# Patient Record
Sex: FEMALE | Race: WHITE | NOT HISPANIC OR LATINO | Employment: PART TIME | URBAN - METROPOLITAN AREA
[De-identification: names, ages, dates, MRNs, and addresses within clinical notes are randomized per-mention and may not be internally consistent; named-entity substitution may affect disease eponyms.]

---

## 2017-05-08 ENCOUNTER — HISTORICAL (OUTPATIENT)
Dept: LAB | Facility: HOSPITAL | Age: 57
End: 2017-05-08

## 2017-05-08 LAB
CHOLEST SERPL-MCNC: 132 MG/DL (ref 0–200)
CHOLEST/HDLC SERPL: 2.1 {RATIO} (ref 0–4)
HDLC SERPL-MCNC: 62 MG/DL (ref 35–60)
LDLC SERPL CALC-MCNC: 48 MG/DL (ref 0–129)
TRIGL SERPL-MCNC: 110 MG/DL (ref 30–150)
VLDLC SERPL CALC-MCNC: 22 MG/DL

## 2018-05-14 ENCOUNTER — HISTORICAL (OUTPATIENT)
Dept: LAB | Facility: HOSPITAL | Age: 58
End: 2018-05-14

## 2018-05-14 LAB
ABS NEUT (OLG): 2.53 X10(3)/MCL (ref 2.1–9.2)
ALBUMIN SERPL-MCNC: 4 GM/DL (ref 3.4–5)
ALBUMIN/GLOB SERPL: 1.4 RATIO (ref 1.1–2)
ALP SERPL-CCNC: 34 UNIT/L (ref 38–126)
ALT SERPL-CCNC: 25 UNIT/L (ref 12–78)
AST SERPL-CCNC: 19 UNIT/L (ref 15–37)
BASOPHILS # BLD AUTO: 0 X10(3)/MCL (ref 0–0.2)
BASOPHILS NFR BLD AUTO: 1 %
BILIRUB SERPL-MCNC: 0.9 MG/DL (ref 0.2–1)
BILIRUBIN DIRECT+TOT PNL SERPL-MCNC: 0.2 MG/DL (ref 0–0.5)
BILIRUBIN DIRECT+TOT PNL SERPL-MCNC: 0.7 MG/DL (ref 0–0.8)
BUN SERPL-MCNC: 15 MG/DL (ref 7–18)
CALCIUM SERPL-MCNC: 9.3 MG/DL (ref 8.5–10.1)
CHLORIDE SERPL-SCNC: 104 MMOL/L (ref 98–107)
CHOLEST SERPL-MCNC: 217 MG/DL (ref 0–200)
CHOLEST/HDLC SERPL: 3.4 {RATIO} (ref 0–4)
CK SERPL-CCNC: 61 UNIT/L (ref 26–192)
CO2 SERPL-SCNC: 31 MMOL/L (ref 21–32)
CREAT SERPL-MCNC: 0.92 MG/DL (ref 0.55–1.02)
EOSINOPHIL # BLD AUTO: 0.2 X10(3)/MCL (ref 0–0.9)
EOSINOPHIL NFR BLD AUTO: 4 %
ERYTHROCYTE [DISTWIDTH] IN BLOOD BY AUTOMATED COUNT: 12.2 % (ref 11.5–17)
GLOBULIN SER-MCNC: 2.9 GM/DL (ref 2.4–3.5)
GLUCOSE SERPL-MCNC: 81 MG/DL (ref 74–106)
HCT VFR BLD AUTO: 43.4 % (ref 37–47)
HDLC SERPL-MCNC: 64 MG/DL (ref 35–60)
HGB BLD-MCNC: 13.4 GM/DL (ref 12–16)
IRON SERPL-MCNC: 110 MCG/DL (ref 50–175)
LDLC SERPL CALC-MCNC: 128 MG/DL (ref 0–129)
LYMPHOCYTES # BLD AUTO: 1.2 X10(3)/MCL (ref 0.6–4.6)
LYMPHOCYTES NFR BLD AUTO: 28 %
MCH RBC QN AUTO: 27.7 PG (ref 27–31)
MCHC RBC AUTO-ENTMCNC: 30.9 GM/DL (ref 33–36)
MCV RBC AUTO: 89.7 FL (ref 80–94)
MONOCYTES # BLD AUTO: 0.3 X10(3)/MCL (ref 0.1–1.3)
MONOCYTES NFR BLD AUTO: 8 %
NEUTROPHILS # BLD AUTO: 2.53 X10(3)/MCL (ref 1.4–7.9)
NEUTROPHILS NFR BLD AUTO: 59 %
PLATELET # BLD AUTO: 306 X10(3)/MCL (ref 130–400)
PMV BLD AUTO: 9.5 FL (ref 9.4–12.4)
POTASSIUM SERPL-SCNC: 4 MMOL/L (ref 3.5–5.1)
PROT SERPL-MCNC: 6.9 GM/DL (ref 6.4–8.2)
RBC # BLD AUTO: 4.84 X10(6)/MCL (ref 4.2–5.4)
SODIUM SERPL-SCNC: 141 MMOL/L (ref 136–145)
TRIGL SERPL-MCNC: 124 MG/DL (ref 30–150)
VLDLC SERPL CALC-MCNC: 25 MG/DL
WBC # SPEC AUTO: 4.3 X10(3)/MCL (ref 4.5–11.5)

## 2018-08-19 ENCOUNTER — OFFICE VISIT (OUTPATIENT)
Dept: URGENT CARE | Facility: CLINIC | Age: 58
End: 2018-08-19
Payer: COMMERCIAL

## 2018-08-19 VITALS
TEMPERATURE: 97 F | HEART RATE: 88 BPM | OXYGEN SATURATION: 100 % | SYSTOLIC BLOOD PRESSURE: 134 MMHG | DIASTOLIC BLOOD PRESSURE: 85 MMHG

## 2018-08-19 DIAGNOSIS — J32.9 RHINOSINUSITIS: Primary | ICD-10-CM

## 2018-08-19 DIAGNOSIS — L40.9 PSORIASIS: ICD-10-CM

## 2018-08-19 PROCEDURE — 99204 OFFICE O/P NEW MOD 45 MIN: CPT | Mod: 25,S$GLB,, | Performed by: PHYSICIAN ASSISTANT

## 2018-08-19 PROCEDURE — 96372 THER/PROPH/DIAG INJ SC/IM: CPT | Mod: S$GLB,,, | Performed by: FAMILY MEDICINE

## 2018-08-19 RX ORDER — METOPROLOL SUCCINATE 25 MG/1
TABLET, EXTENDED RELEASE ORAL
Refills: 2 | COMMUNITY
Start: 2018-08-10 | End: 2022-10-24

## 2018-08-19 RX ORDER — VERAPAMIL HYDROCHLORIDE 120 MG/1
TABLET, FILM COATED, EXTENDED RELEASE ORAL
Refills: 1 | COMMUNITY
Start: 2018-06-19 | End: 2022-11-07

## 2018-08-19 RX ORDER — OLMESARTAN MEDOXOMIL AND HYDROCHLOROTHIAZIDE 20/12.5 20; 12.5 MG/1; MG/1
TABLET ORAL
COMMUNITY
Start: 2015-04-15 | End: 2022-11-07

## 2018-08-19 RX ORDER — BENZONATATE 100 MG/1
100 CAPSULE ORAL 3 TIMES DAILY PRN
Qty: 30 CAPSULE | Refills: 1 | Status: SHIPPED | OUTPATIENT
Start: 2018-08-19 | End: 2019-08-19

## 2018-08-19 RX ORDER — OMEPRAZOLE 20 MG/1
20 CAPSULE, DELAYED RELEASE ORAL
COMMUNITY
End: 2022-11-07

## 2018-08-19 RX ORDER — BETAMETHASONE SODIUM PHOSPHATE AND BETAMETHASONE ACETATE 3; 3 MG/ML; MG/ML
9 INJECTION, SUSPENSION INTRA-ARTICULAR; INTRALESIONAL; INTRAMUSCULAR; SOFT TISSUE
Status: COMPLETED | OUTPATIENT
Start: 2018-08-19 | End: 2018-08-19

## 2018-08-19 RX ORDER — PREDNISONE 20 MG/1
TABLET ORAL
Qty: 18 TABLET | Refills: 0 | Status: SHIPPED | OUTPATIENT
Start: 2018-08-19 | End: 2022-11-07

## 2018-08-19 RX ORDER — PROMETHAZINE HYDROCHLORIDE AND DEXTROMETHORPHAN HYDROBROMIDE 6.25; 15 MG/5ML; MG/5ML
5 SYRUP ORAL NIGHTLY PRN
Qty: 180 ML | Refills: 1 | Status: SHIPPED | OUTPATIENT
Start: 2018-08-19 | End: 2018-08-29

## 2018-08-19 RX ORDER — CETIRIZINE HYDROCHLORIDE 10 MG/1
10 TABLET ORAL DAILY
COMMUNITY
End: 2022-11-07

## 2018-08-19 RX ORDER — FLUTICASONE PROPIONATE 50 MCG
1 SPRAY, SUSPENSION (ML) NASAL DAILY
Qty: 1 BOTTLE | Refills: 1 | Status: SHIPPED | OUTPATIENT
Start: 2018-08-19 | End: 2022-11-07

## 2018-08-19 RX ADMIN — BETAMETHASONE SODIUM PHOSPHATE AND BETAMETHASONE ACETATE 9 MG: 3; 3 INJECTION, SUSPENSION INTRA-ARTICULAR; INTRALESIONAL; INTRAMUSCULAR; SOFT TISSUE at 02:08

## 2018-08-19 NOTE — PATIENT INSTRUCTIONS
"NASAL ALLERGY    Nasal Allergy, also called "Allergic Rhinitis" occurs after exposure to pollen, molds, mildew, animal "dander" (scales from animal skin, hair and feathers), dust, smoke and fumes. (These are called "allergens"). When pollen causes a nasal allergy it is commonly called "Hay Fever".    When these particles contact the lining of the nose, eyes, eyelids, sinuses or throat, they cause the cells to release a chemical called "histamine". Histamine may cause a watery discharge from the eyes or nose. It may also cause violent sneezing, nasal congestion, itching of the eyes, nose, throat and mouth.    PREVENTION:    Nasal allergy cannot be cured but symptoms can be reduced. Avoid or reduce exposure to the allergen when possible.    HOME CARE:    1) DECONGESTANT pills and sprays (Sudafed, NeoSynephrine), reduce tissue swelling and watery discharge. Overuse of nasal decongestant sprays may make symptoms worse, ESPECIALLY IF YOU HAVE HIGH BLOOD PRESSURE. Do not use these more often than recommended. Get an over the counter Nasal Saline spray to supplement Flonase    2) ANTIHISTAMINES block the release of histamine during the allergic response. Antihistamines are more effective when taken BEFORE symptoms develop. Unless a prescription antihistamine was prescribed, you may take CLARITIN (loratadine). (Claritin is an over-the-counter antihistamine that does not cause drowsiness.)    3) STEROID nasal sprays (Beconase, Vancenase, Nasalide, Nasocort, Flonase) or oral steroids (Prednisone) may also be prescribed for more severe symptoms. These help to reduce the local inflammation which adds to the allergic response.    4) If you have ASTHMA, pollen season may make your asthma symptoms worse. It is important that you use your asthma medicines as directed during this time to prevent or treat attacks. Some persons with asthma have a worsening of their asthma symptoms when taking antihistamines. If you notice this, stop " the antihistamines and notify your doctor.    Allergic Reaction /Contact Dermatitis  If your condition worsens or fails to improve we recommend that you receive another evaluation at the ER immediately or contact your PCP to discuss your concerns or return here. You must understand that you've received an urgent care treatment only and that you may be released before all your medical problems are known or treated. You the patient will arrange for followup care as instructed.      Zyrtec, Claritin or Allegra should be used daily for the next 5-7 days to prevent or suppress the itching. You can take Benedryl 25 mg at bedtime as well.      Please take over the counter Pepcid or Zantac as directed for the next 24-72 hours as needed.     If you had a steroid shot/prednisone pills here in the clinic today and given a prescription of a steroid such as prednisone or a Medrol Dose Pack, please don't start the oral prescription until  till tomorrow.      If you develop additional symptoms such as tongue swelling or trouble breathing go immediately to the ER.      FOLLOW UP WITH YOUR DERMATOLOGIST AS WE DISCUSSED IF SYMPTOMS DON'T IMPROVE OVER THE NEXT 4-5 DAYS.     AVOID TRIGGERS!    You received a steroid shot today - this can elevate your blood pressure, elevate your blood sugar, water weight gain, nervous energy, redness to the face and dimpling of the skin where the shot goes in.     If you were prescribed a narcotic medication, do not drive or operate heavy equipment or machinery while taking these medications.  You must understand that you've received an Urgent Care treatment only and that you may be released before all your medical problems are known or treated. You, the patient, will arrange for follow up care as instructed.  Follow up with your PCP or specialty clinic as directed in the next 1-2 weeks if not improved or as needed.  You can call [phone] to schedule an appointment with the appropriate  provider.  If your condition worsens we recommend that you receive another evaluation at the emergency room immediately or contact your primary medical clinics after hours call service to discuss your concerns.  Please return here or go to the Emergency Department for any concerns or worsening of condition.

## 2018-08-19 NOTE — PROGRESS NOTES
Subjective:       Patient ID: Beatrice Ochoa is a 58 y.o. female.    Vitals:  oral temperature is 97.4 °F (36.3 °C). Her blood pressure is 134/85 and her pulse is 88. Her oxygen saturation is 100%.     Chief Complaint: URI    PATIENT C/O SINUSITIS S/S X 3 DAYS. SHE IS CLEANING AN ELDERLY FAMILY MEMBERS HOUSE THAT IS IN BAD CONDITION. SHE HAS BEEN WEARING A MASK.    She has pertinent history of psoriasis and would like a steroid for her flare up.      URI    This is a new problem. The current episode started in the past 7 days. The problem has been gradually worsening. There has been no fever. Associated symptoms include congestion, headaches and sinus pain. Pertinent negatives include no abdominal pain, chest pain, coughing, ear pain, nausea, sore throat or wheezing. She has tried antihistamine for the symptoms. The treatment provided no relief.     Review of Systems   Constitution: Negative for chills, fever and malaise/fatigue.   HENT: Positive for congestion and sinus pain. Negative for ear pain, hoarse voice and sore throat.    Eyes: Negative for discharge and redness.   Cardiovascular: Negative for chest pain, dyspnea on exertion and leg swelling.   Respiratory: Negative for cough, shortness of breath, sputum production and wheezing.    Musculoskeletal: Negative for myalgias.   Gastrointestinal: Negative for abdominal pain and nausea.   Neurological: Positive for headaches.       Objective:      Physical Exam   Constitutional: She is oriented to person, place, and time. She appears well-developed and well-nourished. She is cooperative.  Non-toxic appearance. She does not appear ill. No distress.   HENT:   Head: Normocephalic and atraumatic.   Right Ear: Hearing, tympanic membrane, external ear and ear canal normal.   Left Ear: Hearing, tympanic membrane, external ear and ear canal normal.   Nose: Nose normal. No mucosal edema, rhinorrhea or nasal deformity. No epistaxis. Right sinus exhibits no maxillary sinus  tenderness and no frontal sinus tenderness. Left sinus exhibits no maxillary sinus tenderness and no frontal sinus tenderness.   Mouth/Throat: Uvula is midline and mucous membranes are normal. No trismus in the jaw. Normal dentition. No uvula swelling. Posterior oropharyngeal erythema present.   Eyes: Conjunctivae and lids are normal. No scleral icterus.   Sclera clear bilat   Neck: Trachea normal, full passive range of motion without pain and phonation normal. Neck supple.   Cardiovascular: Normal rate, regular rhythm, normal heart sounds, intact distal pulses and normal pulses.   Pulmonary/Chest: Effort normal and breath sounds normal. No respiratory distress.   Abdominal: Soft. Normal appearance and bowel sounds are normal. She exhibits no distension. There is no tenderness.   Musculoskeletal: Normal range of motion. She exhibits no edema or deformity.   Neurological: She is alert and oriented to person, place, and time. She exhibits normal muscle tone. Coordination normal.   Skin: Skin is warm, dry and intact. Rash noted. She is not diaphoretic. No pallor.        Psychiatric: She has a normal mood and affect. Her speech is normal and behavior is normal. Judgment and thought content normal. Cognition and memory are normal.   Nursing note and vitals reviewed.      Assessment:       1. Rhinosinusitis    2. Psoriasis        Plan:         Rhinosinusitis  -     betamethasone acetate-betamethasone sodium phosphate injection 9 mg; Inject 1.5 mLs (9 mg total) into the muscle one time.  -     benzonatate (TESSALON PERLES) 100 MG capsule; Take 1 capsule (100 mg total) by mouth 3 (three) times daily as needed for Cough.  Dispense: 30 capsule; Refill: 1  -     promethazine-dextromethorphan (PROMETHAZINE-DM) 6.25-15 mg/5 mL Syrp; Take 5 mLs by mouth nightly as needed.  Dispense: 180 mL; Refill: 1  -     fluticasone (FLONASE) 50 mcg/actuation nasal spray; 1 spray (50 mcg total) by Each Nare route once daily.  Dispense: 1  "Bottle; Refill: 1  -     sodium chloride (OCEAN NASAL) 0.65 % nasal spray; 1 spray by Nasal route as needed for Congestion.  Dispense: 45 mL; Refill: 3    Psoriasis  -     predniSONE (DELTASONE) 20 MG tablet; Take 2 pills x 5 days, 1 pill x 5 days, 1/2 pill until empty.  Dispense: 18 tablet; Refill: 0      Patient Instructions   NASAL ALLERGY    Nasal Allergy, also called "Allergic Rhinitis" occurs after exposure to pollen, molds, mildew, animal "dander" (scales from animal skin, hair and feathers), dust, smoke and fumes. (These are called "allergens"). When pollen causes a nasal allergy it is commonly called "Hay Fever".    When these particles contact the lining of the nose, eyes, eyelids, sinuses or throat, they cause the cells to release a chemical called "histamine". Histamine may cause a watery discharge from the eyes or nose. It may also cause violent sneezing, nasal congestion, itching of the eyes, nose, throat and mouth.    PREVENTION:    Nasal allergy cannot be cured but symptoms can be reduced. Avoid or reduce exposure to the allergen when possible.    HOME CARE:    1) DECONGESTANT pills and sprays (Sudafed, NeoSynephrine), reduce tissue swelling and watery discharge. Overuse of nasal decongestant sprays may make symptoms worse, ESPECIALLY IF YOU HAVE HIGH BLOOD PRESSURE. Do not use these more often than recommended. Get an over the counter Nasal Saline spray to supplement Flonase    2) ANTIHISTAMINES block the release of histamine during the allergic response. Antihistamines are more effective when taken BEFORE symptoms develop. Unless a prescription antihistamine was prescribed, you may take CLARITIN (loratadine). (Claritin is an over-the-counter antihistamine that does not cause drowsiness.)    3) STEROID nasal sprays (Beconase, Vancenase, Nasalide, Nasocort, Flonase) or oral steroids (Prednisone) may also be prescribed for more severe symptoms. These help to reduce the local inflammation which adds to " the allergic response.    4) If you have ASTHMA, pollen season may make your asthma symptoms worse. It is important that you use your asthma medicines as directed during this time to prevent or treat attacks. Some persons with asthma have a worsening of their asthma symptoms when taking antihistamines. If you notice this, stop the antihistamines and notify your doctor.    Allergic Reaction /Contact Dermatitis  If your condition worsens or fails to improve we recommend that you receive another evaluation at the ER immediately or contact your PCP to discuss your concerns or return here. You must understand that you've received an urgent care treatment only and that you may be released before all your medical problems are known or treated. You the patient will arrange for followup care as instructed.      Zyrtec, Claritin or Allegra should be used daily for the next 5-7 days to prevent or suppress the itching. You can take Benedryl 25 mg at bedtime as well.      Please take over the counter Pepcid or Zantac as directed for the next 24-72 hours as needed.     If you had a steroid shot/prednisone pills here in the clinic today and given a prescription of a steroid such as prednisone or a Medrol Dose Pack, please don't start the oral prescription until  till tomorrow.      If you develop additional symptoms such as tongue swelling or trouble breathing go immediately to the ER.      FOLLOW UP WITH YOUR DERMATOLOGIST AS WE DISCUSSED IF SYMPTOMS DON'T IMPROVE OVER THE NEXT 4-5 DAYS.     AVOID TRIGGERS!    You received a steroid shot today - this can elevate your blood pressure, elevate your blood sugar, water weight gain, nervous energy, redness to the face and dimpling of the skin where the shot goes in.     If you were prescribed a narcotic medication, do not drive or operate heavy equipment or machinery while taking these medications.  You must understand that you've received an Urgent Care treatment only and that you may  be released before all your medical problems are known or treated. You, the patient, will arrange for follow up care as instructed.  Follow up with your PCP or specialty clinic as directed in the next 1-2 weeks if not improved or as needed.  You can call [phone] to schedule an appointment with the appropriate provider.  If your condition worsens we recommend that you receive another evaluation at the emergency room immediately or contact your primary medical clinics after hours call service to discuss your concerns.  Please return here or go to the Emergency Department for any concerns or worsening of condition.

## 2019-08-09 ENCOUNTER — HISTORICAL (OUTPATIENT)
Dept: LAB | Facility: HOSPITAL | Age: 59
End: 2019-08-09

## 2019-08-09 LAB
ABS NEUT (OLG): 2.88 X10(3)/MCL (ref 2.1–9.2)
ALBUMIN SERPL-MCNC: 3.8 GM/DL (ref 3.4–5)
ALBUMIN/GLOB SERPL: 1.3 RATIO (ref 1.1–2)
ALP SERPL-CCNC: 37 UNIT/L (ref 38–126)
ALT SERPL-CCNC: 49 UNIT/L (ref 12–78)
APPEARANCE, UA: CLEAR
AST SERPL-CCNC: 35 UNIT/L (ref 15–37)
BACTERIA SPEC CULT: NORMAL /HPF
BASOPHILS # BLD AUTO: 0 X10(3)/MCL (ref 0–0.2)
BASOPHILS NFR BLD AUTO: 1 %
BILIRUB SERPL-MCNC: 0.7 MG/DL (ref 0.2–1)
BILIRUB UR QL STRIP: NEGATIVE
BILIRUBIN DIRECT+TOT PNL SERPL-MCNC: 0.1 MG/DL (ref 0–0.5)
BILIRUBIN DIRECT+TOT PNL SERPL-MCNC: 0.6 MG/DL (ref 0–0.8)
BUN SERPL-MCNC: 17 MG/DL (ref 7–18)
CALCIUM SERPL-MCNC: 9.5 MG/DL (ref 8.5–10.1)
CHLORIDE SERPL-SCNC: 105 MMOL/L (ref 98–107)
CHOLEST SERPL-MCNC: 141 MG/DL (ref 0–200)
CHOLEST/HDLC SERPL: 2.3 {RATIO} (ref 0–4)
CO2 SERPL-SCNC: 30 MMOL/L (ref 21–32)
COLOR UR: YELLOW
CREAT SERPL-MCNC: 0.91 MG/DL (ref 0.55–1.02)
DEPRECATED CALCIDIOL+CALCIFEROL SERPL-MC: 37.42 NG/ML (ref 30–80)
EOSINOPHIL # BLD AUTO: 0.1 X10(3)/MCL (ref 0–0.9)
EOSINOPHIL NFR BLD AUTO: 2 %
ERYTHROCYTE [DISTWIDTH] IN BLOOD BY AUTOMATED COUNT: 11.9 % (ref 11.5–17)
EST. AVERAGE GLUCOSE BLD GHB EST-MCNC: 97 MG/DL
GLOBULIN SER-MCNC: 2.9 GM/DL (ref 2.4–3.5)
GLUCOSE (UA): NEGATIVE
GLUCOSE SERPL-MCNC: 94 MG/DL (ref 74–106)
HBA1C MFR BLD: 5 % (ref 4.2–6.3)
HCT VFR BLD AUTO: 38.4 % (ref 37–47)
HDLC SERPL-MCNC: 62 MG/DL (ref 35–60)
HGB BLD-MCNC: 11.8 GM/DL (ref 12–16)
HGB UR QL STRIP: NEGATIVE
IRON SERPL-MCNC: 122 MCG/DL (ref 50–175)
KETONES UR QL STRIP: NEGATIVE
LDLC SERPL CALC-MCNC: 65 MG/DL (ref 0–129)
LEUKOCYTE ESTERASE UR QL STRIP: NEGATIVE
LYMPHOCYTES # BLD AUTO: 1.3 X10(3)/MCL (ref 0.6–4.6)
LYMPHOCYTES NFR BLD AUTO: 27 %
MCH RBC QN AUTO: 26.9 PG (ref 27–31)
MCHC RBC AUTO-ENTMCNC: 30.7 GM/DL (ref 33–36)
MCV RBC AUTO: 87.5 FL (ref 80–94)
MONOCYTES # BLD AUTO: 0.4 X10(3)/MCL (ref 0.1–1.3)
MONOCYTES NFR BLD AUTO: 8 %
NEUTROPHILS # BLD AUTO: 2.88 X10(3)/MCL (ref 2.1–9.2)
NEUTROPHILS NFR BLD AUTO: 61 %
NITRITE UR QL STRIP: NEGATIVE
PH UR STRIP: 7.5 [PH] (ref 5–9)
PLATELET # BLD AUTO: 306 X10(3)/MCL (ref 130–400)
PMV BLD AUTO: 9.5 FL (ref 9.4–12.4)
POTASSIUM SERPL-SCNC: 4.1 MMOL/L (ref 3.5–5.1)
PROT SERPL-MCNC: 6.7 GM/DL (ref 6.4–8.2)
PROT UR QL STRIP: NEGATIVE
RBC # BLD AUTO: 4.39 X10(6)/MCL (ref 4.2–5.4)
RBC #/AREA URNS HPF: NORMAL /[HPF]
SODIUM SERPL-SCNC: 143 MMOL/L (ref 136–145)
SP GR UR STRIP: 1.01 (ref 1–1.03)
SQUAMOUS EPITHELIAL, UA: NORMAL
TRIGL SERPL-MCNC: 69 MG/DL (ref 30–150)
TSH SERPL-ACNC: 0.8 MIU/L (ref 0.36–3.74)
UROBILINOGEN UR STRIP-ACNC: 0.2
VLDLC SERPL CALC-MCNC: 14 MG/DL
WBC # SPEC AUTO: 4.7 X10(3)/MCL (ref 4.5–11.5)
WBC #/AREA URNS HPF: NORMAL /[HPF]

## 2019-09-16 ENCOUNTER — HISTORICAL (OUTPATIENT)
Dept: ENDOSCOPY | Facility: HOSPITAL | Age: 59
End: 2019-09-16

## 2019-09-16 LAB — CRC RECOMMENDATION EXT: NORMAL

## 2021-10-11 LAB — BCS RECOMMENDATION EXT: NORMAL

## 2021-10-25 ENCOUNTER — HISTORICAL (OUTPATIENT)
Dept: ADMINISTRATIVE | Facility: HOSPITAL | Age: 61
End: 2021-10-25

## 2021-10-25 LAB
ABS NEUT (OLG): 3.88 X10(3)/MCL (ref 2.1–9.2)
ALBUMIN SERPL-MCNC: 4 GM/DL (ref 3.5–5)
ALBUMIN/GLOB SERPL: 1.5 RATIO (ref 1.1–2)
ALP SERPL-CCNC: 34 UNIT/L (ref 40–150)
ALT SERPL-CCNC: 33 UNIT/L (ref 0–55)
APPEARANCE, UA: CLEAR
AST SERPL-CCNC: 30 UNIT/L (ref 5–34)
BACTERIA SPEC CULT: NORMAL /HPF
BASOPHILS # BLD AUTO: 0 X10(3)/MCL (ref 0–0.2)
BASOPHILS NFR BLD AUTO: 1 %
BILIRUB SERPL-MCNC: 0.5 MG/DL
BILIRUB UR QL STRIP: NEGATIVE
BILIRUBIN DIRECT+TOT PNL SERPL-MCNC: 0.2 MG/DL (ref 0–0.5)
BILIRUBIN DIRECT+TOT PNL SERPL-MCNC: 0.3 MG/DL (ref 0–0.8)
BUN SERPL-MCNC: 20.6 MG/DL (ref 9.8–20.1)
CALCIUM SERPL-MCNC: 9.7 MG/DL (ref 8.7–10.5)
CHLORIDE SERPL-SCNC: 106 MMOL/L (ref 98–107)
CK SERPL-CCNC: 62 U/L (ref 29–168)
CO2 SERPL-SCNC: 31 MMOL/L (ref 22–29)
COLOR UR: NORMAL
CREAT SERPL-MCNC: 0.76 MG/DL (ref 0.55–1.02)
CRP SERPL HS-MCNC: 0.13 MG/DL
EOSINOPHIL # BLD AUTO: 0.2 X10(3)/MCL (ref 0–0.9)
EOSINOPHIL NFR BLD AUTO: 4 %
ERYTHROCYTE [DISTWIDTH] IN BLOOD BY AUTOMATED COUNT: 12.8 % (ref 11.5–17)
EST. AVERAGE GLUCOSE BLD GHB EST-MCNC: 99.7 MG/DL
GLOBULIN SER-MCNC: 2.7 GM/DL (ref 2.4–3.5)
GLUCOSE (UA): NEGATIVE
GLUCOSE SERPL-MCNC: 74 MG/DL (ref 74–100)
HBA1C MFR BLD: 5.1 %
HCT VFR BLD AUTO: 41 % (ref 37–47)
HGB BLD-MCNC: 12.5 GM/DL (ref 12–16)
HGB UR QL STRIP: NEGATIVE
KETONES UR QL STRIP: NEGATIVE
LEUKOCYTE ESTERASE UR QL STRIP: NEGATIVE
LYMPHOCYTES # BLD AUTO: 1.2 X10(3)/MCL (ref 0.6–4.6)
LYMPHOCYTES NFR BLD AUTO: 20 %
MCH RBC QN AUTO: 26.9 PG (ref 27–31)
MCHC RBC AUTO-ENTMCNC: 30.5 GM/DL (ref 33–36)
MCV RBC AUTO: 88.2 FL (ref 80–94)
MONOCYTES # BLD AUTO: 0.5 X10(3)/MCL (ref 0.1–1.3)
MONOCYTES NFR BLD AUTO: 8 %
NEUTROPHILS # BLD AUTO: 3.88 X10(3)/MCL (ref 2.1–9.2)
NEUTROPHILS NFR BLD AUTO: 66 %
NITRITE UR QL STRIP: NEGATIVE
PH UR STRIP: 7 [PH] (ref 5–9)
PLATELET # BLD AUTO: 341 X10(3)/MCL (ref 130–400)
PMV BLD AUTO: 10 FL (ref 9.4–12.4)
POTASSIUM SERPL-SCNC: 3.9 MMOL/L (ref 3.5–5.1)
PROT SERPL-MCNC: 6.7 GM/DL (ref 6.4–8.3)
PROT UR QL STRIP: NEGATIVE
RBC # BLD AUTO: 4.65 X10(6)/MCL (ref 4.2–5.4)
RBC #/AREA URNS HPF: NORMAL /[HPF]
SODIUM SERPL-SCNC: 145 MMOL/L (ref 136–145)
SP GR UR STRIP: 1.02 (ref 1–1.03)
SQUAMOUS EPITHELIAL, UA: NORMAL /HPF (ref 0–4)
TSH SERPL-ACNC: 0.51 UIU/ML (ref 0.35–4.94)
UROBILINOGEN UR STRIP-ACNC: 0.2
WBC # SPEC AUTO: 5.9 X10(3)/MCL (ref 4.5–11.5)
WBC #/AREA URNS HPF: NORMAL /[HPF]

## 2021-12-08 ENCOUNTER — HISTORICAL (OUTPATIENT)
Dept: ADMINISTRATIVE | Facility: HOSPITAL | Age: 61
End: 2021-12-08

## 2022-02-21 LAB — BCS RECOMMENDATION EXT: NORMAL

## 2022-04-10 ENCOUNTER — HISTORICAL (OUTPATIENT)
Dept: ADMINISTRATIVE | Facility: HOSPITAL | Age: 62
End: 2022-04-10
Payer: COMMERCIAL

## 2022-04-13 ENCOUNTER — HISTORICAL (OUTPATIENT)
Dept: LAB | Facility: HOSPITAL | Age: 62
End: 2022-04-13
Payer: COMMERCIAL

## 2022-04-13 LAB
ABS NEUT (OLG): 3.5 (ref 2.1–9.2)
ALBUMIN SERPL-MCNC: 4.1 G/DL (ref 3.4–4.8)
ALBUMIN/GLOB SERPL: 1.4 {RATIO} (ref 1.1–2)
ALP SERPL-CCNC: 30 U/L (ref 40–150)
ALT SERPL-CCNC: 18 U/L (ref 0–55)
AST SERPL-CCNC: 20 U/L (ref 5–34)
BASOPHILS # BLD AUTO: 0.05 10*3/UL (ref 0–0.2)
BASOPHILS NFR BLD AUTO: 0.9 % (ref 0–1)
BILIRUB SERPL-MCNC: 0.6 MG/DL (ref 0.2–1.2)
BILIRUBIN DIRECT+TOT PNL SERPL-MCNC: 0.2 (ref 0–0.5)
BILIRUBIN DIRECT+TOT PNL SERPL-MCNC: 0.4 (ref 0–0.8)
BUN SERPL-MCNC: 20 MG/DL (ref 9.8–20.1)
CALCIUM SERPL-MCNC: 9.5 MG/DL (ref 8.4–10.2)
CHLORIDE SERPL-SCNC: 104 MMOL/L (ref 98–107)
CHOLEST SERPL-MCNC: 164 MG/DL
CHOLEST/HDLC SERPL: 3 {RATIO} (ref 0–5)
CK SERPL-CCNC: 63 U/L (ref 29–168)
CO2 SERPL-SCNC: 31 MMOL/L (ref 23–31)
CREAT SERPL-MCNC: 0.94 MG/DL (ref 0.57–1.11)
EOSINOPHIL # BLD AUTO: 0.19 10*3/UL (ref 0–0.9)
EOSINOPHIL NFR BLD AUTO: 3.3 % (ref 0–6.4)
ERYTHROCYTE [DISTWIDTH] IN BLOOD BY AUTOMATED COUNT: 12 % (ref 11.5–17)
GLOBULIN SER-MCNC: 3 G/DL (ref 2.4–3.5)
GLUCOSE SERPL-MCNC: 90 MG/DL (ref 82–115)
HCT VFR BLD AUTO: 39.6 % (ref 37–47)
HDLC SERPL-MCNC: 59 MG/DL (ref 40–60)
HEMOLYSIS INTERF INDEX SERPL-ACNC: 8
HGB BLD-MCNC: 12.6 G/DL (ref 12–16)
ICTERIC INTERF INDEX SERPL-ACNC: 1
IMM GRANULOCYTES # BLD AUTO: 0.01 10*3/UL (ref 0–0.02)
IMM GRANULOCYTES NFR BLD AUTO: 0.2 % (ref 0–0.43)
LDLC SERPL CALC-MCNC: 85 MG/DL (ref 50–140)
LIPEMIC INTERF INDEX SERPL-ACNC: 1
LYMPHOCYTES # BLD AUTO: 1.45 10*3/UL (ref 0.6–4.6)
LYMPHOCYTES NFR BLD AUTO: 25.4 % (ref 16–44)
MANUAL DIFF? (OHS): NO
MCH RBC QN AUTO: 27 PG (ref 27–31)
MCHC RBC AUTO-ENTMCNC: 31.8 G/DL (ref 33–36)
MCV RBC AUTO: 85 FL (ref 80–94)
MONOCYTES # BLD AUTO: 0.5 10*3/UL (ref 0.1–1.3)
MONOCYTES NFR BLD AUTO: 8.8 % (ref 4–12.1)
NEUTROPHILS # BLD AUTO: 3.5 10*3/UL (ref 2.1–9.2)
NEUTROPHILS NFR BLD AUTO: 61.4 % (ref 43–73)
NRBC BLD AUTO-RTO: 0 % (ref 0–0.2)
PLATELET # BLD AUTO: 311 10*3/UL (ref 130–400)
PMV BLD AUTO: 8.9 FL (ref 7.4–10.4)
POTASSIUM SERPL-SCNC: 3.6 MMOL/L (ref 3.5–5.1)
PROT SERPL-MCNC: 7.1 G/DL (ref 5.8–7.6)
RBC # BLD AUTO: 4.66 10*6/UL (ref 4.2–5.4)
SODIUM SERPL-SCNC: 145 MMOL/L (ref 136–145)
TRIGL SERPL-MCNC: 100 MG/DL (ref 0–150)
VLDLC SERPL CALC-MCNC: 20 MG/DL
WBC # SPEC AUTO: 5.7 10*3/UL (ref 4.5–11.5)

## 2022-04-28 VITALS
WEIGHT: 148.13 LBS | DIASTOLIC BLOOD PRESSURE: 74 MMHG | HEIGHT: 61 IN | SYSTOLIC BLOOD PRESSURE: 109 MMHG | OXYGEN SATURATION: 98 % | BODY MASS INDEX: 27.97 KG/M2

## 2022-07-07 ENCOUNTER — HOSPITAL ENCOUNTER (OUTPATIENT)
Dept: RADIOLOGY | Facility: HOSPITAL | Age: 62
Discharge: HOME | End: 2022-07-07
Attending: PHYSICIAN ASSISTANT
Payer: COMMERCIAL

## 2022-07-07 DIAGNOSIS — R63.0 ANOREXIA: ICD-10-CM

## 2022-07-07 DIAGNOSIS — R10.13 EPIGASTRIC ABDOMINAL PAIN: ICD-10-CM

## 2022-07-07 DIAGNOSIS — R11.0 NAUSEA: ICD-10-CM

## 2022-07-07 PROCEDURE — 74019 RADEX ABDOMEN 2 VIEWS: CPT | Mod: TC

## 2022-07-12 ENCOUNTER — PATIENT MESSAGE (OUTPATIENT)
Dept: FAMILY MEDICINE | Facility: CLINIC | Age: 62
End: 2022-07-12
Payer: COMMERCIAL

## 2022-07-12 RX ORDER — MONTELUKAST SODIUM 10 MG/1
10 TABLET ORAL DAILY
COMMUNITY
End: 2022-07-12 | Stop reason: SDUPTHER

## 2022-07-12 RX ORDER — MONTELUKAST SODIUM 10 MG/1
10 TABLET ORAL DAILY
Qty: 30 TABLET | Refills: 3 | Status: SHIPPED | OUTPATIENT
Start: 2022-07-12 | End: 2022-10-24

## 2022-07-25 ENCOUNTER — TELEPHONE (OUTPATIENT)
Dept: FAMILY MEDICINE | Facility: CLINIC | Age: 62
End: 2022-07-25
Payer: COMMERCIAL

## 2022-07-25 NOTE — TELEPHONE ENCOUNTER
----- Message from Rey Noyola sent at 7/25/2022 10:05 AM CDT -----  Regarding: medical advice  Type:  Needs Medical Advice    Who Called: Gastroenterology   Symptoms (please be specific):  na   How long has patient had these symptoms:  na  Pharmacy name and phone #:  na  Would the patient rather a call back or a response via [facility]? na  Best Call Back Number: na  Additional Information: need most recent labs faxed to [phone]

## 2022-08-15 RX ORDER — FENOFIBRATE 48 MG/1
48 TABLET, FILM COATED ORAL DAILY
Qty: 30 TABLET | Refills: 2 | Status: SHIPPED | OUTPATIENT
Start: 2022-08-15 | End: 2022-10-24

## 2022-08-15 RX ORDER — FENOFIBRATE 48 MG/1
48 TABLET, FILM COATED ORAL DAILY
COMMUNITY
Start: 2022-08-08 | End: 2022-08-15 | Stop reason: SDUPTHER

## 2022-08-15 NOTE — TELEPHONE ENCOUNTER
----- Message from Morgan Lawrence sent at 8/15/2022 10:13 AM CDT -----  .Type:  Needs Medical Advice    Who Called: [facility]  Symptoms (please be specific):   How long has patient had these symptoms:    Pharmacy name and phone #:    Would the patient rather a call back or a response via [facility]?   Best Call Back Number: [phone]  Additional Information: She has some questions about a medication for this patient.

## 2022-10-17 ENCOUNTER — TELEPHONE (OUTPATIENT)
Dept: FAMILY MEDICINE | Facility: CLINIC | Age: 62
End: 2022-10-17
Payer: COMMERCIAL

## 2022-10-17 DIAGNOSIS — Z00.00 WELLNESS EXAMINATION: Primary | ICD-10-CM

## 2022-10-17 NOTE — TELEPHONE ENCOUNTER
----- Message from Naldo Rouse MA sent at 10/17/2022 10:07 AM CDT -----  Regarding: FW: labs  [facility]    Need to print orders for pt  ----- Message -----  From: Pauline Morris  Sent: 10/17/2022   9:59 AM CDT  To: [facility] Staff  Subject: labs                                             Caller is requesting to schedule their Lab appointment prior to annual appointment.  Order is not listed in EPIC.  Please enter order and contact patient to schedule.    Name of Caller:pt    Preferred Date and Time of Labs: today    Date of [facility] Appointment:11/7    Where would they like the lab performed? [facility]    Would the patient rather a call back or a response via [facility]? C/b    Best Call Back Number:[phone]    Additional Information:please put lab orders in as soon as possible and contact pt so she can pick the lab orders up wants to have them done outside of [facility]

## 2022-11-07 ENCOUNTER — TELEPHONE (OUTPATIENT)
Dept: FAMILY MEDICINE | Facility: CLINIC | Age: 62
End: 2022-11-07

## 2022-11-07 ENCOUNTER — OFFICE VISIT (OUTPATIENT)
Dept: FAMILY MEDICINE | Facility: CLINIC | Age: 62
End: 2022-11-07
Payer: COMMERCIAL

## 2022-11-07 VITALS
DIASTOLIC BLOOD PRESSURE: 75 MMHG | HEIGHT: 60 IN | OXYGEN SATURATION: 98 % | BODY MASS INDEX: 30.63 KG/M2 | TEMPERATURE: 98 F | HEART RATE: 91 BPM | SYSTOLIC BLOOD PRESSURE: 112 MMHG | WEIGHT: 156 LBS | RESPIRATION RATE: 18 BRPM

## 2022-11-07 DIAGNOSIS — Z23 FLU VACCINE NEED: ICD-10-CM

## 2022-11-07 DIAGNOSIS — R00.0 TACHYCARDIA: ICD-10-CM

## 2022-11-07 DIAGNOSIS — E66.09 CLASS 1 OBESITY DUE TO EXCESS CALORIES WITH SERIOUS COMORBIDITY AND BODY MASS INDEX (BMI) OF 30.0 TO 30.9 IN ADULT: ICD-10-CM

## 2022-11-07 DIAGNOSIS — Z78.0 POSTMENOPAUSAL: ICD-10-CM

## 2022-11-07 DIAGNOSIS — Z12.31 VISIT FOR SCREENING MAMMOGRAM: ICD-10-CM

## 2022-11-07 DIAGNOSIS — E04.1 THYROID NODULE: ICD-10-CM

## 2022-11-07 DIAGNOSIS — Z13.820 SCREENING FOR OSTEOPOROSIS: ICD-10-CM

## 2022-11-07 DIAGNOSIS — E04.1 THYROID NODULE: Primary | ICD-10-CM

## 2022-11-07 DIAGNOSIS — M06.4 INFLAMMATORY POLYARTHROPATHY: ICD-10-CM

## 2022-11-07 DIAGNOSIS — E78.49 ESSENTIAL FAMILIAL HYPERLIPIDEMIA: ICD-10-CM

## 2022-11-07 DIAGNOSIS — Z00.00 WELLNESS EXAMINATION: Primary | ICD-10-CM

## 2022-11-07 PROCEDURE — 90471 IMMUNIZATION ADMIN: CPT | Mod: ,,, | Performed by: FAMILY MEDICINE

## 2022-11-07 PROCEDURE — 3078F DIAST BP <80 MM HG: CPT | Mod: CPTII,,, | Performed by: FAMILY MEDICINE

## 2022-11-07 PROCEDURE — 90686 IIV4 VACC NO PRSV 0.5 ML IM: CPT | Mod: ,,, | Performed by: FAMILY MEDICINE

## 2022-11-07 PROCEDURE — 1159F PR MEDICATION LIST DOCUMENTED IN MEDICAL RECORD: ICD-10-PCS | Mod: CPTII,,, | Performed by: FAMILY MEDICINE

## 2022-11-07 PROCEDURE — 3008F BODY MASS INDEX DOCD: CPT | Mod: CPTII,,, | Performed by: FAMILY MEDICINE

## 2022-11-07 PROCEDURE — 3074F SYST BP LT 130 MM HG: CPT | Mod: CPTII,,, | Performed by: FAMILY MEDICINE

## 2022-11-07 PROCEDURE — 99396 PREV VISIT EST AGE 40-64: CPT | Mod: 25,,, | Performed by: FAMILY MEDICINE

## 2022-11-07 PROCEDURE — 1160F PR REVIEW ALL MEDS BY PRESCRIBER/CLIN PHARMACIST DOCUMENTED: ICD-10-PCS | Mod: CPTII,,, | Performed by: FAMILY MEDICINE

## 2022-11-07 PROCEDURE — 3074F PR MOST RECENT SYSTOLIC BLOOD PRESSURE < 130 MM HG: ICD-10-PCS | Mod: CPTII,,, | Performed by: FAMILY MEDICINE

## 2022-11-07 PROCEDURE — 99212 PR OFFICE/OUTPT VISIT, EST, LEVL II, 10-19 MIN: ICD-10-PCS | Mod: 25,,, | Performed by: FAMILY MEDICINE

## 2022-11-07 PROCEDURE — 3008F PR BODY MASS INDEX (BMI) DOCUMENTED: ICD-10-PCS | Mod: CPTII,,, | Performed by: FAMILY MEDICINE

## 2022-11-07 PROCEDURE — 99396 PR PREVENTIVE VISIT,EST,40-64: ICD-10-PCS | Mod: 25,,, | Performed by: FAMILY MEDICINE

## 2022-11-07 PROCEDURE — 3078F PR MOST RECENT DIASTOLIC BLOOD PRESSURE < 80 MM HG: ICD-10-PCS | Mod: CPTII,,, | Performed by: FAMILY MEDICINE

## 2022-11-07 PROCEDURE — 1159F MED LIST DOCD IN RCRD: CPT | Mod: CPTII,,, | Performed by: FAMILY MEDICINE

## 2022-11-07 PROCEDURE — 90686 PR FLU VACCINE, QIIV4, NO PRSV, 0.5 ML, IM: ICD-10-PCS | Mod: ,,, | Performed by: FAMILY MEDICINE

## 2022-11-07 PROCEDURE — 99212 OFFICE O/P EST SF 10 MIN: CPT | Mod: 25,,, | Performed by: FAMILY MEDICINE

## 2022-11-07 PROCEDURE — 1160F RVW MEDS BY RX/DR IN RCRD: CPT | Mod: CPTII,,, | Performed by: FAMILY MEDICINE

## 2022-11-07 PROCEDURE — 90471 PR IMMUNIZ ADMIN,1 SINGLE/COMB VAC/TOXOID: ICD-10-PCS | Mod: ,,, | Performed by: FAMILY MEDICINE

## 2022-11-07 RX ORDER — ASPIRIN 81 MG/1
81 TABLET ORAL
COMMUNITY

## 2022-11-07 RX ORDER — OLMESARTAN MEDOXOMIL, AMLODIPINE AND HYDROCHLOROTHIAZIDE TABLET 40/5/12.5 MG 40; 5; 12.5 MG/1; MG/1; MG/1
1 TABLET ORAL DAILY
COMMUNITY
Start: 2022-08-08 | End: 2022-11-29

## 2022-11-07 RX ORDER — PANTOPRAZOLE SODIUM 40 MG/1
40 TABLET, DELAYED RELEASE ORAL 3 TIMES DAILY
COMMUNITY
Start: 2022-10-25 | End: 2023-09-06 | Stop reason: SDUPTHER

## 2022-11-07 NOTE — PROGRESS NOTES
Patient ID: [id number]     Chief Complaint: Annual Exam        HPI:     Beatrice Ochoa is a 62 y.o. female here today for annual wellness exam.  The patient presents for well adult exam, The patient's general health status is described as good. The patient's diet is described as balanced. Exercise: occasional. Associated symptoms consist of denies weight loss, denies weight gain, denies fatigue, denies headache, denies hearing loss and denies vision changes. Last menstrual period: patient no longer menstruates due to hysterectomy (with BSO due to uterine fibroids), last DEXA scan on 01/07/2021, WNL. Additional pertinent history: last dental exam: goes every 6 months last eye exam: 2022 (wears eyeglasses/contacts), last mammogram: 02/2022([facility], at [facility]), last colonoscopy: 09/16/2019 ([facility] with GI (Dr. Lei), has scheduled for 2024), seat belt use, no caffeine use, tobacco use none, social alcohol use and She had annual labs done in 10/2022 at [facility], would like our office to get the results. She would like flu vaccine today, she is not interested COVID-19 booster, is UTD on all vaccines, including Tdap. Tachycardia is stable with medication and followed by Cardiology (Dr. Watkins). Psoriasis/Inflammatory arthritis is controlled with medication and followed by Dermatology (Dr. Aragon) and Rheumatology (Dr. Carbone), but she would like to see Rheumatology (Dr. Pace), needs referral and she does followup with Ortho ([facility]) as scheduled. Hypercholesterolemia is well controlled with Tricor, no adverse side effects. Migraine headaches are controlled with Rx, no side effects. She has a history of thyroid nodules, asymptomatic, she had NL US thyroid on 10/19/2021, would like scheduled at [facility]. She reports that she is compliant with oral iron for iron deficiency, she denies adverse Rx side effects.  - She is obese, will work on losing weight on her own, she is not interested in Rx for weight loss, weight  surgery, or seeing a Dietician.  - Patient is without any other complaints today.     Advance Care Planning     Date: 2022    Living Will  During this visit, I engaged the patient  in the advance care planning process.  The patient and I reviewed the role for advance directives and their purpose in directing future healthcare if the patient's unable to speak for him/herself.  At this point in time, the patient does have full decision-making capacity.  We discussed different extreme health states that she could experience, and reviewed what kind of medical care she would want in those situations.  The patient communicated that if she were comatose and had little chance of a meaningful recovery, she would not want machines/life-sustaining treatments used. In addition to the above preference, other important end-of-life issues for the patient include  patient would like to be an organ donor . The patient has completed a living will to reflect these preferences.  I spent a total of 5 minutes engaging the patient in this advance care planning discussion.          ----------------------------  Hypertension  Hypertriglyceridemia  Insomnia  Migraine  Obesity, unspecified  Psoriasis     Past Surgical History:   Procedure Laterality Date     SECTION      CHOLECYSTECTOMY      FRACTURE SURGERY      HYSTERECTOMY      TONSILLECTOMY         Review of patient's allergies indicates:   Allergen Reactions    Codeine Itching     severe    Neosporin g.u. irrigant     Oxycodone-acetaminophen Itching     Severe itching    Zolmitriptan     Levofloxacin Rash    Macrolide antibiotics Rash       Outpatient Medications Marked as Taking for the 22 encounter (Office Visit) with Loretta Gillette MD   Medication Sig Dispense Refill    aspirin (ECOTRIN) 81 MG EC tablet Take 81 mg by mouth.      fenofibrate (TRICOR) 48 MG tablet TAKE ONE TABLET BY MOUTH EVERY DAY 90 tablet 3    metoprolol succinate (TOPROL-XL) 25 MG 24 hr  tablet TAKE 1/2 TABLET BY MOUTH EVERY DAY: DO NOT CRUSH OR CHEW 45 tablet 3    montelukast (SINGULAIR) 10 mg tablet TAKE ONE TABLET BY MOUTH EVERY DAY 90 tablet 3    olmesartan-amLODIPin-hcthiazid 40-5-12.5 mg Tab Take 1 tablet by mouth once daily.      pantoprazole (PROTONIX) 40 MG tablet Take 40 mg by mouth 3 (three) times daily.       Current Facility-Administered Medications for the 11/7/22 encounter (Office Visit) with Loretta Gillette MD   Medication Dose Route Frequency Provider Last Rate Last Admin    influenza (QUADRIVALENT PF) vaccine 0.5 mL  0.5 mL Intramuscular vaccine x 1 dose Loretta Gillette MD           Social History     Socioeconomic History    Marital status:    Tobacco Use    Smoking status: Never    Smokeless tobacco: Never   Substance and Sexual Activity    Alcohol use: Never    Drug use: Never    Sexual activity: Yes     Partners: Female        Family History   Problem Relation Age of Onset    Arthritis Mother     Hyperlipidemia Mother     Heart disease Father     Stroke Father         Subjective:       Review of Systems:    See HPI for details    Constitutional: No fever, No chills, No fatigue.   Eye: No blurring, No visual disturbances.   Ear/Nose/Mouth/Throat: No decreased hearing, No ear pain, No sore throat.   Respiratory: No shortness of breath, No cough, No wheezing.   Cardiovascular: No chest pain, No palpitations, No peripheral edema.   Breast: Both breasts, No lump/ mass, No pain.   Nipple discharge: None.   Gastrointestinal: No nausea, No vomiting, No diarrhea, No constipation, No abdominal pain.   Genitourinary: No dysuria, No hematuria.   Gynecologic: Negative except as documented in history of present illness.   Hematology/Lymphatics: No bruising tendency, No bleeding tendency, No swollen lymph glands.   Endocrine: No excessive thirst, No polyuria, No excessive hunger.   Musculoskeletal: Joint pain (at times), No muscle pain, No decreased range of motion.    Integumentary: No rash, No pruritus.   Neurologic: No abnormal balance, No confusion, No numbness.   Psychiatric: No insomnia, No anxiety, No depression, Not suicidal, No hallucinations.    All Other ROS: Negative except as stated in HPI.       Objective:     /75 (BP Location: Right arm, Patient Position: Sitting, BP Method: Medium (Automatic))   Pulse 91   Temp 98.2 °F (36.8 °C) (Oral)   Resp 18   Ht 5' (1.524 m)   Wt 70.8 kg (156 lb)   SpO2 98%   BMI 30.47 kg/m²     Physical Exam    General: Alert and oriented, No acute distress. Obese.  Eye: Pupils are equal, round and reactive to light, Extraocular movements are intact, Normal conjunctiva.   HENT: Normocephalic, Normal hearing, Oral mucosa is moist, No pharyngeal erythema. Throat: Pharynx ( Not edematous, No exudate ).   Neck: Supple, Non-tender, No carotid bruit, No lymphadenopathy, No thyromegaly.   Respiratory: Lungs are clear to auscultation, Respirations are non-labored, Breath sounds are equal, Symmetrical chest wall expansion, No chest wall tenderness.   Cardiovascular: Normal rate, Regular rhythm, No murmur, Good pulses equal in all extremities, No edema.   Gastrointestinal: Soft, Non-tender, Non-distended, Normal bowel sounds, No organomegaly.   Genitourinary: No costovertebral angle tenderness.   Musculoskeletal: Normal range of motion, No tenderness, Normal gait. Bilateral fingers with nodules of DIPs.   Neurologic: No focal deficits, Cranial Nerves II-XII are grossly intact.   Psychiatric: Cooperative, Appropriate mood & affect, Normal judgment, Non-suicidal.   Mood and affect: Calm          Assessment:       ICD-10-CM ICD-9-CM   1. Wellness examination  Z00.00 V70.0   2. Visit for screening mammogram  Z12.31 V76.12   3. Postmenopausal  Z78.0 V49.81   4. Screening for osteoporosis  Z13.820 V82.81   5. Flu vaccine need  Z23 V04.81   6. Thyroid nodule  E04.1 241.0   7. Essential familial hyperlipidemia  E78.49 272.2   8. Tachycardia   R00.0 785.0   9. Class 1 obesity due to excess calories with serious comorbidity and body mass index (BMI) of 30.0 to 30.9 in adult  E66.09 278.00    Z68.30 V85.30   10. Inflammatory polyarthropathy  M06.4 714.9        Plan:     Problem List Items Addressed This Visit          Immunology/Multi System    Inflammatory polyarthropathy    Relevant Orders    Ambulatory referral/consult to Rheumatology     Other Visit Diagnoses       Wellness examination    -  Primary    Visit for screening mammogram        Relevant Orders    Mammo Digital Screening Bilat w/ Basim    Postmenopausal        Relevant Orders    DXA Bone Density Spine And Hip    Screening for osteoporosis        Relevant Orders    DXA Bone Density Spine And Hip    Flu vaccine need        Relevant Medications    influenza (QUADRIVALENT PF) vaccine 0.5 mL (Start on 11/7/2022  9:34 AM)    Thyroid nodule        Relevant Orders    US Soft Tissue Head Neck Thyroid    Essential familial hyperlipidemia        Tachycardia        Class 1 obesity due to excess calories with serious comorbidity and body mass index (BMI) of 30.0 to 30.9 in adult             1. Wellness examination  - Will treat pending lab results. Monthly breast self exam encouraged. Diet, exercise, and 10% weight loss encouraged. Keep appointment for dental exams x q6 months as scheduled. Keep appointment for annual eye exam as scheduled. Keep appointment with specialists as scheduled. Notify M.D. or ER if temp greater than 100.4, or any acute illness.      2. Visit for screening mammogram  - Mammo Digital Screening Bilat w/ Basim; Future  - Mammo Digital Screening Bilat w/ Basim    3. Postmenopausal  - DXA Bone Density Spine And Hip; Future  - DXA Bone Density Spine And Hip    4. Screening for osteoporosis  - DXA Bone Density Spine And Hip; Future  - DXA Bone Density Spine And Hip    5. Flu vaccine need  - influenza (QUADRIVALENT PF) vaccine 0.5 mL IM x 1 today    6. Thyroid nodule  - US Soft Tissue Head  Neck Thyroid; Future  -  Soft Tissue Head Neck Thyroid    7. Essential familial hyperlipidemia  - Will treat pending results.   Continue  Stressed importance of dietary modifications. Follow a low cholesterol, low saturated fat diet with less that 200mg of cholesterol a day.  Avoid fried foods and high saturated fats (high saturated fats less than 7% of calories).  Add Flax Seed/Fish Oil supplements to diet. Increase dietary fiber.  Regular exercise can reduce LDL and raise HDL. Stressed importance of physical activity 5 times per week for 30 minutes per day.      8. Tachycardia  - HR is well controlled. Keep daily BP log. Continue followup with Cardiology as scheduled. Will titrate BP Rx until BP is <140/90. Notify M.D. or ER if BP >170/100 or <90/60, chest pain, palpitations, headache, SOB, temp greater than 100.4, or any acute illness.   Continue  Low Sodium Diet (DASH Diet - Less than 2 grams of sodium per day).  Monitor blood pressure daily and log. Report consistent numbers greater than 140/90.  Smoking cessation encouraged to aid in BP reduction.  Maintain healthy weight with goal BMI <30. Exercise 30 minutes per day, 5 days per week.      9. Class 1 obesity due to excess calories with serious comorbidity and body mass index (BMI) of 30.0 to 30.9 in adult  Body mass index is 30.47 kg/m².  Goal BMI <30.  Exercise 5 times a week for 30 minutes per day.  Avoid soda, simple sugars, excessive rice, potatoes or bread. Limit fast foods and fried foods.  Choose complex carbs in moderation (example: green vegetables, beans, oatmeal). Eat plenty of fresh fruits and vegetables with lean meats daily.  Do not skip meals. Eat a balanced portion size.  Avoid fad diets. Consider permanent healthy life style changes.      10. Inflammatory polyarthropathy  - Ambulatory referral/consult to Rheumatology; Future for evaluation and treatment        Beatrice was seen today for annual exam.    Diagnoses and all orders for this  visit:    Wellness examination    Visit for screening mammogram  -     Mammo Digital Screening Bilat w/ Basim; Future  -     Mammo Digital Screening Bilat w/ Basim    Postmenopausal  -     DXA Bone Density Spine And Hip; Future  -     DXA Bone Density Spine And Hip    Screening for osteoporosis  -     DXA Bone Density Spine And Hip; Future  -     DXA Bone Density Spine And Hip    Flu vaccine need  -     influenza (QUADRIVALENT PF) vaccine 0.5 mL    Thyroid nodule  -     US Soft Tissue Head Neck Thyroid; Future  -     US Soft Tissue Head Neck Thyroid    Essential familial hyperlipidemia    Tachycardia    Class 1 obesity due to excess calories with serious comorbidity and body mass index (BMI) of 30.0 to 30.9 in adult    Inflammatory polyarthropathy  -     Ambulatory referral/consult to Rheumatology; Future        Medication List with Changes/Refills   Current Medications    ASPIRIN (ECOTRIN) 81 MG EC TABLET    Take 81 mg by mouth.       Start Date: --        End Date: --    FENOFIBRATE (TRICOR) 48 MG TABLET    TAKE ONE TABLET BY MOUTH EVERY DAY       Start Date: 10/24/2022End Date: --    METOPROLOL SUCCINATE (TOPROL-XL) 25 MG 24 HR TABLET    TAKE 1/2 TABLET BY MOUTH EVERY DAY: DO NOT CRUSH OR CHEW       Start Date: 10/24/2022End Date: --    MONTELUKAST (SINGULAIR) 10 MG TABLET    TAKE ONE TABLET BY MOUTH EVERY DAY       Start Date: 10/24/2022End Date: --    OLMESARTAN-AMLODIPIN-HCTHIAZID 40-5-12.5 MG TAB    Take 1 tablet by mouth once daily.       Start Date: 8/8/2022  End Date: --    PANTOPRAZOLE (PROTONIX) 40 MG TABLET    Take 40 mg by mouth 3 (three) times daily.       Start Date: 10/25/2022End Date: --   Discontinued Medications    CETIRIZINE (ZYRTEC) 10 MG TABLET    Take 10 mg by mouth once daily.       Start Date: --        End Date: 11/7/2022    FLUTICASONE (FLONASE) 50 MCG/ACTUATION NASAL SPRAY    1 spray (50 mcg total) by Each Nare route once daily.       Start Date: 8/19/2018 End Date: 11/7/2022     OLMESARTAN-HYDROCHLOROTHIAZIDE (BENICAR HCT) 20-12.5 MG PER TABLET           Start Date: 4/15/2015 End Date: 11/7/2022    OMEPRAZOLE (PRILOSEC) 20 MG CAPSULE    Take 20 mg by mouth.       Start Date: --        End Date: 11/7/2022    PREDNISONE (DELTASONE) 20 MG TABLET    Take 2 pills x 5 days, 1 pill x 5 days, 1/2 pill until empty.       Start Date: 8/19/2018 End Date: 11/7/2022    VERAPAMIL (CALAN-SR) 120 MG CR TABLET           Start Date: 6/19/2018 End Date: 11/7/2022          Follow up in about 6 months (around 5/7/2023) for Cholesterol Followup- 30 minute appointment; *Labs from [facility].

## 2022-11-07 NOTE — TELEPHONE ENCOUNTER
----- Message from Naldo Rouse MA sent at 11/7/2022 11:23 AM CST -----  Regarding: FW: call back    ----- Message -----  From: Grace Gonzalez  Sent: 11/7/2022  11:20 AM CST  To: [facility] Staff  Subject: call back                                        .Type:  Needs Medical Advice    Who Called: fili  Would the patient rather a call back or a response via [facility]?   Best Call Back Number: [phone]  Additional Information: pt said she needs a order saying just thyroid she said the order that was sent say's multiple stuff like head and neck and they can't do it unless it say's just thyroid

## 2022-11-08 ENCOUNTER — PATIENT MESSAGE (OUTPATIENT)
Dept: FAMILY MEDICINE | Facility: CLINIC | Age: 62
End: 2022-11-08
Payer: COMMERCIAL

## 2022-11-16 ENCOUNTER — PATIENT MESSAGE (OUTPATIENT)
Dept: FAMILY MEDICINE | Facility: CLINIC | Age: 62
End: 2022-11-16
Payer: COMMERCIAL

## 2022-11-21 ENCOUNTER — PATIENT MESSAGE (OUTPATIENT)
Dept: FAMILY MEDICINE | Facility: CLINIC | Age: 62
End: 2022-11-21
Payer: COMMERCIAL

## 2022-11-28 ENCOUNTER — PATIENT MESSAGE (OUTPATIENT)
Dept: FAMILY MEDICINE | Facility: CLINIC | Age: 62
End: 2022-11-28
Payer: COMMERCIAL

## 2022-12-02 ENCOUNTER — DOCUMENTATION ONLY (OUTPATIENT)
Dept: FAMILY MEDICINE | Facility: CLINIC | Age: 62
End: 2022-12-02
Payer: COMMERCIAL

## 2022-12-05 ENCOUNTER — TELEPHONE (OUTPATIENT)
Dept: FAMILY MEDICINE | Facility: CLINIC | Age: 62
End: 2022-12-05
Payer: COMMERCIAL

## 2022-12-05 NOTE — TELEPHONE ENCOUNTER
----- Message from Loretta Gillette MD sent at 12/2/2022 11:53 AM CST -----  Cholesterol is well controlled, continue current treatment plan, recheck CMP, FLP, CPK in 06/2023. Remaining labs are essentially normal.

## 2022-12-11 ENCOUNTER — PATIENT OUTREACH (OUTPATIENT)
Dept: ADMINISTRATIVE | Facility: HOSPITAL | Age: 62
End: 2022-12-11
Payer: COMMERCIAL

## 2022-12-20 ENCOUNTER — DOCUMENTATION ONLY (OUTPATIENT)
Dept: INTERNAL MEDICINE | Facility: CLINIC | Age: 62
End: 2022-12-20
Payer: COMMERCIAL

## 2023-01-05 ENCOUNTER — OFFICE VISIT (OUTPATIENT)
Dept: FAMILY MEDICINE | Facility: CLINIC | Age: 63
End: 2023-01-05
Payer: COMMERCIAL

## 2023-01-05 ENCOUNTER — TELEPHONE (OUTPATIENT)
Dept: FAMILY MEDICINE | Facility: CLINIC | Age: 63
End: 2023-01-05

## 2023-01-05 VITALS
HEART RATE: 104 BPM | DIASTOLIC BLOOD PRESSURE: 92 MMHG | WEIGHT: 156.69 LBS | HEIGHT: 60 IN | RESPIRATION RATE: 20 BRPM | OXYGEN SATURATION: 97 % | BODY MASS INDEX: 30.76 KG/M2 | SYSTOLIC BLOOD PRESSURE: 146 MMHG

## 2023-01-05 DIAGNOSIS — M06.4 INFLAMMATORY POLYARTHROPATHY: ICD-10-CM

## 2023-01-05 DIAGNOSIS — J40 BRONCHITIS: Primary | ICD-10-CM

## 2023-01-05 PROCEDURE — 3080F DIAST BP >= 90 MM HG: CPT | Mod: CPTII,,, | Performed by: FAMILY MEDICINE

## 2023-01-05 PROCEDURE — 1159F MED LIST DOCD IN RCRD: CPT | Mod: CPTII,,, | Performed by: FAMILY MEDICINE

## 2023-01-05 PROCEDURE — 99214 PR OFFICE/OUTPT VISIT, EST, LEVL IV, 30-39 MIN: ICD-10-PCS | Mod: ,,, | Performed by: FAMILY MEDICINE

## 2023-01-05 PROCEDURE — 1159F PR MEDICATION LIST DOCUMENTED IN MEDICAL RECORD: ICD-10-PCS | Mod: CPTII,,, | Performed by: FAMILY MEDICINE

## 2023-01-05 PROCEDURE — 1160F RVW MEDS BY RX/DR IN RCRD: CPT | Mod: CPTII,,, | Performed by: FAMILY MEDICINE

## 2023-01-05 PROCEDURE — 99214 OFFICE O/P EST MOD 30 MIN: CPT | Mod: ,,, | Performed by: FAMILY MEDICINE

## 2023-01-05 PROCEDURE — 3077F SYST BP >= 140 MM HG: CPT | Mod: CPTII,,, | Performed by: FAMILY MEDICINE

## 2023-01-05 PROCEDURE — 1160F PR REVIEW ALL MEDS BY PRESCRIBER/CLIN PHARMACIST DOCUMENTED: ICD-10-PCS | Mod: CPTII,,, | Performed by: FAMILY MEDICINE

## 2023-01-05 PROCEDURE — 3077F PR MOST RECENT SYSTOLIC BLOOD PRESSURE >= 140 MM HG: ICD-10-PCS | Mod: CPTII,,, | Performed by: FAMILY MEDICINE

## 2023-01-05 PROCEDURE — 3008F BODY MASS INDEX DOCD: CPT | Mod: CPTII,,, | Performed by: FAMILY MEDICINE

## 2023-01-05 PROCEDURE — 3008F PR BODY MASS INDEX (BMI) DOCUMENTED: ICD-10-PCS | Mod: CPTII,,, | Performed by: FAMILY MEDICINE

## 2023-01-05 PROCEDURE — 3080F PR MOST RECENT DIASTOLIC BLOOD PRESSURE >= 90 MM HG: ICD-10-PCS | Mod: CPTII,,, | Performed by: FAMILY MEDICINE

## 2023-01-05 RX ORDER — ALBUTEROL SULFATE 90 UG/1
2 AEROSOL, METERED RESPIRATORY (INHALATION) EVERY 6 HOURS PRN
Qty: 18 G | Refills: 0 | Status: SHIPPED | OUTPATIENT
Start: 2023-01-05 | End: 2023-04-03

## 2023-01-05 RX ORDER — PROMETHAZINE HYDROCHLORIDE AND DEXTROMETHORPHAN HYDROBROMIDE 6.25; 15 MG/5ML; MG/5ML
5 SYRUP ORAL EVERY 8 HOURS PRN
Qty: 180 ML | Refills: 0 | Status: SHIPPED | OUTPATIENT
Start: 2023-01-05 | End: 2023-01-15

## 2023-01-05 RX ORDER — DOXYCYCLINE 100 MG/1
100 CAPSULE ORAL EVERY 12 HOURS
Qty: 14 CAPSULE | Refills: 0 | Status: SHIPPED | OUTPATIENT
Start: 2023-01-05 | End: 2023-01-12

## 2023-01-05 NOTE — TELEPHONE ENCOUNTER
Patient called back after speaking to pharmacy. Son had to pay out-of-pocket for her Rx of albuterol prescribed due to PA being denied. Insurance offered the alternatives of:    1.Ventolin HFA 18 GM 90 MCG   2.Albuterol HFA Inhaler 8.5 GM 90 MCG     Already spoke to patient on alteratives and said she was fine with either. Just wanted to see which one you would like to prescribe as an alternative.

## 2023-01-05 NOTE — TELEPHONE ENCOUNTER
Called patient to let her know her PA was completed and her Rx for albuterol was not covered by her insurance. There were alternatives listed that she could try, but reported her son had already picked up the medicine. Not sure if he paid it out of pocket or not.     Pt agreed to call pharmacy to see what happened and said will return call to let us know what happened. Was told if it was paid out of pocket and insurance is denied,  would like to see if she could try a breathing therapy.

## 2023-01-05 NOTE — PROGRESS NOTES
Patient ID: [id number]     Chief Complaint: Persistent URI        HPI:     Beatrice Ochoa is a 62 y.o. female here today for sinus headache, earache, chest congestion, dry cough, fatigue, sneezing, watery eyes, sore throat due to constant coughing x 1 week. She denies wheezing, fever, or chills. Reports mild SOB only with coughing spells. She went to Urgent Care 6 days ago for these symptoms, was given cortisone injection and Rx for Augmentin, she tested negative for COVID-19 and flu, she has negative COVID-19 testing daily at home. She denies sick contacts. She has tried OTC Coricidin without resolution of symptoms.   - She has inflammatory arthritis, stable, she has an appointment scheduled with Rheumatology (Dr. Pace) on 2023.   - Patient is without any other complaints today.       ----------------------------  Hypertension  Hypertriglyceridemia  Insomnia  Migraine  Obesity, unspecified  Psoriasis     Past Surgical History:   Procedure Laterality Date     SECTION      CHOLECYSTECTOMY      FRACTURE SURGERY      HYSTERECTOMY      TONSILLECTOMY         Review of patient's allergies indicates:   Allergen Reactions    Codeine Itching     severe    Neosporin g.u. irrigant     Oxycodone-acetaminophen Itching     Severe itching    Zolmitriptan     Levofloxacin Rash    Macrolide antibiotics Rash       Outpatient Medications Marked as Taking for the 23 encounter (Office Visit) with Loretta Gillette MD   Medication Sig Dispense Refill    aspirin (ECOTRIN) 81 MG EC tablet Take 81 mg by mouth.      fenofibrate (TRICOR) 48 MG tablet TAKE ONE TABLET BY MOUTH EVERY DAY 90 tablet 3    metoprolol succinate (TOPROL-XL) 25 MG 24 hr tablet TAKE 1/2 TABLET BY MOUTH EVERY DAY: DO NOT CRUSH OR CHEW 45 tablet 3    montelukast (SINGULAIR) 10 mg tablet TAKE ONE TABLET BY MOUTH EVERY DAY 90 tablet 3    olmesartan-amLODIPin-hcthiazid 40-5-12.5 mg Tab TAKE ONE TABLET BY MOUTH EVERY DAY 90 tablet 3    pantoprazole  (PROTONIX) 40 MG tablet Take 40 mg by mouth 3 (three) times daily.         Social History     Socioeconomic History    Marital status:    Tobacco Use    Smoking status: Never    Smokeless tobacco: Never   Substance and Sexual Activity    Alcohol use: Never    Drug use: Never    Sexual activity: Yes     Partners: Female        Family History   Problem Relation Age of Onset    Arthritis Mother     Hyperlipidemia Mother     Heart disease Father     Stroke Father         Subjective:       Review of Systems:    See HPI for details    Constitutional: As per HPI. Denies Change in appetite. Denies Chills. Denies Fever. Denies Night sweats.  Eye: Denies Blurred vision. Denies Discharge. Denies Eye pain.  ENT: Denies Decreased hearing. Reports Sore throat. Denies Swollen glands.  Respiratory: Reports Cough. Reports mild Shortness of breath. Denies Shortness of breath with exertion. Denies Wheezing.  Cardiovascular: Denies Chest pain at rest. Denies Chest pain with exertion. Denies Irregular heartbeat. Denies Palpitations.  Gastrointestinal: Denies Abdominal pain. Denies Diarrhea. Denies Nausea. Denies Vomiting. Denies Hematemesis or Hematochezia.  Genitourinary: Denies Dysuria. Denies Urinary frequency. Denies Urinary urgency. Denies Blood in urine.  Endocrine: Denies Cold intolerance. Denies Excessive thirst. Denies Heat intolerance. Denies Weight loss. Denies Weight gain.  Musculoskeletal: Denies Painful joints. Denies Weakness.  Integumentary: Denies Rash. Denies Itching. Denies Dry skin.  Neurologic: Denies Dizziness. Denies Fainting. Denies Headache.  Psychiatric: Denies Depression. Denies Anxiety. Denies Suicidal/Homicidal ideations.    All Other ROS: Negative except as stated in HPI.   Answers submitted by the patient for this visit:  Review of Systems Questionnaire (Submitted on 1/4/2023)  activity change: Yes  unexpected weight change: No  neck pain: No  hearing loss: No  rhinorrhea: No  trouble swallowing:  No  eye discharge: No  visual disturbance: No  chest tightness: Yes  wheezing: No  chest pain: No  palpitations: Yes  blood in stool: No  constipation: No  vomiting: No  diarrhea: Yes  polydipsia: No  polyuria: No  difficulty urinating: No  hematuria: No  menstrual problem: No  dysuria: No  arthralgias: No  headaches: Yes  weakness: No  confusion: No  dysphoric mood: No      Objective:     BP (!) 146/92 (BP Location: Right arm, Patient Position: Sitting, BP Method: Small (Automatic))   Pulse 104   Resp 20   Ht 5' (1.524 m)   Wt 71.1 kg (156 lb 11.2 oz)   SpO2 97%   BMI 30.60 kg/m²     Physical Exam    General: Alert and oriented, No acute distress. Obese.  Head: Normocephalic, Atraumatic. Bilateral allergic shiners.  Eye: Pupils are equal, round and reactive to light, Extraocular movements are intact, Sclera non-icteric.  Ears/Nose/Throat: Normal, Mucosa moist,Clear.  Neck/Thyroid: Supple, Non-tender, No carotid bruit, No palpable thyromegaly or thyroid nodule, No lymphadenopathy, No JVD, Full range of motion.  Respiratory: Clear to auscultation bilaterally; No wheezes, rales or rhonchi,Non-labored respirations, Symmetrical chest wall expansion. Hacking cough.  Cardiovascular: Regular rate and rhythm, S1/S2 normal, No murmurs, rubs or gallops.  Gastrointestinal: Soft, Non-tender, Non-distended, Normal bowel sounds, No palpable organomegaly.  Musculoskeletal: Normal range of motion.  Integumentary: Warm, Dry, Intact, No suspicious lesions or rashes.  Extremities: No clubbing, cyanosis or edema  Neurologic: No focal deficits, Cranial Nerves II-XII are grossly intact, Motor strength normal upper and lower extremities, Sensory exam intact.  Psychiatric: Normal interaction, Coherent speech, Euthymic mood, Appropriate affect         Assessment:       ICD-10-CM ICD-9-CM   1. Bronchitis  J40 490   2. Inflammatory polyarthropathy  M06.4 714.9        Plan:     Problem List Items Addressed This Visit           Immunology/Multi System    Inflammatory polyarthropathy     Other Visit Diagnoses       Bronchitis    -  Primary    Relevant Medications    albuterol (VENTOLIN HFA) 90 mcg/actuation inhaler    promethazine-dextromethorphan (PROMETHAZINE-DM) 6.25-15 mg/5 mL Syrp    doxycycline (MONODOX) 100 MG capsule    Other Relevant Orders    X-Ray Chest PA And Lateral         1. Bronchitis  - X-Ray Chest PA And Lateral; Future  - albuterol (VENTOLIN HFA) 90 mcg/actuation inhaler; Inhale 2 puffs into the lungs every 6 (six) hours as needed for Wheezing. Rescue  Dispense: 18 g; Refill: 0  - promethazine-dextromethorphan (PROMETHAZINE-DM) 6.25-15 mg/5 mL Syrp; Take 5 mLs by mouth every 8 (eight) hours as needed (cough).  Dispense: 180 mL; Refill: 0  - doxycycline (MONODOX) 100 MG capsule; Take 1 capsule (100 mg total) by mouth every 12 (twelve) hours. for 7 days  Dispense: 14 capsule; Refill: 0  - Discontinue Augmentin due to ineffectiveness. Rx trial of Doxycycline, Ventolin HFA, and Promethazine DM. Will followup CXR results. Increase fluid intake. If no improvement, will proceed with PFTs and CT chest. Notify M.D. or ER if symptoms persist or worsen, SOB, wheezing, hemoptysis, chest pain, temp >100.4, or any acute illness.      2. Inflammatory polyarthropathy  - Continue followup with Rheumatology as scheduled.       Beatrice was seen today for persistent uri.    Diagnoses and all orders for this visit:    Bronchitis  -     X-Ray Chest PA And Lateral; Future  -     albuterol (VENTOLIN HFA) 90 mcg/actuation inhaler; Inhale 2 puffs into the lungs every 6 (six) hours as needed for Wheezing. Rescue  -     promethazine-dextromethorphan (PROMETHAZINE-DM) 6.25-15 mg/5 mL Syrp; Take 5 mLs by mouth every 8 (eight) hours as needed (cough).  -     doxycycline (MONODOX) 100 MG capsule; Take 1 capsule (100 mg total) by mouth every 12 (twelve) hours. for 7 days    Inflammatory polyarthropathy          Medication List with Changes/Refills   New  Medications    ALBUTEROL (VENTOLIN HFA) 90 MCG/ACTUATION INHALER    Inhale 2 puffs into the lungs every 6 (six) hours as needed for Wheezing. Rescue       Start Date: 1/5/2023  End Date: 1/5/2024    DOXYCYCLINE (MONODOX) 100 MG CAPSULE    Take 1 capsule (100 mg total) by mouth every 12 (twelve) hours. for 7 days       Start Date: 1/5/2023  End Date: 1/12/2023    PROMETHAZINE-DEXTROMETHORPHAN (PROMETHAZINE-DM) 6.25-15 MG/5 ML SYRP    Take 5 mLs by mouth every 8 (eight) hours as needed (cough).       Start Date: 1/5/2023  End Date: 1/15/2023   Current Medications    ASPIRIN (ECOTRIN) 81 MG EC TABLET    Take 81 mg by mouth.       Start Date: --        End Date: --    FENOFIBRATE (TRICOR) 48 MG TABLET    TAKE ONE TABLET BY MOUTH EVERY DAY       Start Date: 10/24/2022End Date: --    METOPROLOL SUCCINATE (TOPROL-XL) 25 MG 24 HR TABLET    TAKE 1/2 TABLET BY MOUTH EVERY DAY: DO NOT CRUSH OR CHEW       Start Date: 10/24/2022End Date: --    MONTELUKAST (SINGULAIR) 10 MG TABLET    TAKE ONE TABLET BY MOUTH EVERY DAY       Start Date: 10/24/2022End Date: --    OLMESARTAN-AMLODIPIN-HCTHIAZID 40-5-12.5 MG TAB    TAKE ONE TABLET BY MOUTH EVERY DAY       Start Date: 11/29/2022End Date: --    PANTOPRAZOLE (PROTONIX) 40 MG TABLET    Take 40 mg by mouth 3 (three) times daily.       Start Date: 10/25/2022End Date: --          Follow up if symptoms worsen or fail to improve.

## 2023-01-06 ENCOUNTER — TELEPHONE (OUTPATIENT)
Dept: FAMILY MEDICINE | Facility: CLINIC | Age: 63
End: 2023-01-06
Payer: COMMERCIAL

## 2023-01-06 NOTE — TELEPHONE ENCOUNTER
----- Message from Loretta Gillette MD sent at 1/5/2023  3:48 PM CST -----  Chest Xray shows mild arthritis changes in her back, but otherwise normal. No evidence of pneumonia.

## 2023-01-24 ENCOUNTER — PATIENT MESSAGE (OUTPATIENT)
Dept: FAMILY MEDICINE | Facility: CLINIC | Age: 63
End: 2023-01-24
Payer: COMMERCIAL

## 2023-01-24 ENCOUNTER — TELEPHONE (OUTPATIENT)
Dept: FAMILY MEDICINE | Facility: CLINIC | Age: 63
End: 2023-01-24
Payer: COMMERCIAL

## 2023-01-24 DIAGNOSIS — J01.90 ACUTE SINUSITIS, RECURRENCE NOT SPECIFIED, UNSPECIFIED LOCATION: Primary | ICD-10-CM

## 2023-01-24 RX ORDER — METHYLPREDNISOLONE 4 MG/1
TABLET ORAL
Qty: 21 EACH | Refills: 0 | Status: SHIPPED | OUTPATIENT
Start: 2023-01-24 | End: 2023-02-14

## 2023-01-24 NOTE — TELEPHONE ENCOUNTER
Called pt and she voiced her sinus' are back and she is feeling bad. She's afraid of getting bronchitis again and is wondering if we can prescribe her anything to help.   Please advise. Thanks.

## 2023-01-24 NOTE — TELEPHONE ENCOUNTER
----- Message from Tere Shoemaker sent at 1/24/2023  4:04 PM CST -----  .Type:  Needs Medical Advice    Who Called: pt  Symptoms (please be specific):    How long has patient had these symptoms:    Pharmacy name and phone #:    Would the patient rather a call back or a response via [facility]? Call back  Best Call Back Number: [phone]  Additional Information: pt is requesting a call back

## 2023-01-31 ENCOUNTER — LAB REQUISITION (OUTPATIENT)
Dept: LAB | Facility: HOSPITAL | Age: 63
End: 2023-01-31
Payer: COMMERCIAL

## 2023-01-31 DIAGNOSIS — J32.0 CHRONIC MAXILLARY SINUSITIS: ICD-10-CM

## 2023-01-31 PROCEDURE — 87220 TISSUE EXAM FOR FUNGI: CPT | Performed by: OTOLARYNGOLOGY

## 2023-01-31 PROCEDURE — 87205 SMEAR GRAM STAIN: CPT | Performed by: OTOLARYNGOLOGY

## 2023-01-31 PROCEDURE — 87070 CULTURE OTHR SPECIMN AEROBIC: CPT | Performed by: OTOLARYNGOLOGY

## 2023-02-01 LAB
GRAM STN SPEC: NORMAL
KOH PREP SPEC: NORMAL

## 2023-02-03 LAB — BACTERIA SPEC CULT: ABNORMAL

## 2023-02-05 ENCOUNTER — PATIENT MESSAGE (OUTPATIENT)
Dept: FAMILY MEDICINE | Facility: CLINIC | Age: 63
End: 2023-02-05
Payer: COMMERCIAL

## 2023-02-23 ENCOUNTER — TELEPHONE (OUTPATIENT)
Dept: FAMILY MEDICINE | Facility: CLINIC | Age: 63
End: 2023-02-23
Payer: COMMERCIAL

## 2023-02-23 NOTE — TELEPHONE ENCOUNTER
Called pt and stated home test are not acceptable. She will need to go to Urgent Care. Pt voiced she will sit and ride it out with OTC medications.

## 2023-02-23 NOTE — TELEPHONE ENCOUNTER
----- Message from Benton Brandt sent at 2/23/2023 11:56 AM CST -----  .Type:  Needs Medical Advice    Who Called: pt  Symptoms (please be specific): fever, nausea, congestion, covid  How long has patient had these symptoms:  02/23/23  Pharmacy name and phone #:    Would the patient rather a call back or a response via [facility]? Call back  Best Call Back Number: [phone]  Additional Information: pt is asking for advice, pt took at home test

## 2023-03-23 ENCOUNTER — PATIENT MESSAGE (OUTPATIENT)
Dept: FAMILY MEDICINE | Facility: CLINIC | Age: 63
End: 2023-03-23
Payer: COMMERCIAL

## 2023-03-23 ENCOUNTER — DOCUMENTATION ONLY (OUTPATIENT)
Dept: FAMILY MEDICINE | Facility: CLINIC | Age: 63
End: 2023-03-23
Payer: COMMERCIAL

## 2023-03-23 LAB — BCS RECOMMENDATION EXT: NORMAL

## 2023-03-27 ENCOUNTER — PATIENT MESSAGE (OUTPATIENT)
Dept: FAMILY MEDICINE | Facility: CLINIC | Age: 63
End: 2023-03-27
Payer: COMMERCIAL

## 2023-03-27 ENCOUNTER — DOCUMENTATION ONLY (OUTPATIENT)
Dept: FAMILY MEDICINE | Facility: CLINIC | Age: 63
End: 2023-03-27
Payer: COMMERCIAL

## 2023-04-03 ENCOUNTER — OFFICE VISIT (OUTPATIENT)
Dept: RHEUMATOLOGY | Facility: CLINIC | Age: 63
End: 2023-04-03
Payer: COMMERCIAL

## 2023-04-03 VITALS
WEIGHT: 158.63 LBS | HEIGHT: 61 IN | SYSTOLIC BLOOD PRESSURE: 135 MMHG | BODY MASS INDEX: 29.95 KG/M2 | DIASTOLIC BLOOD PRESSURE: 79 MMHG | TEMPERATURE: 99 F | HEART RATE: 90 BPM | OXYGEN SATURATION: 98 %

## 2023-04-03 DIAGNOSIS — L40.9 PSORIASIS: ICD-10-CM

## 2023-04-03 DIAGNOSIS — M06.4 INFLAMMATORY POLYARTHROPATHY: ICD-10-CM

## 2023-04-03 DIAGNOSIS — G47.00 INSOMNIA, UNSPECIFIED TYPE: Primary | ICD-10-CM

## 2023-04-03 PROCEDURE — 99204 OFFICE O/P NEW MOD 45 MIN: CPT | Mod: S$GLB,,, | Performed by: INTERNAL MEDICINE

## 2023-04-03 PROCEDURE — 1159F PR MEDICATION LIST DOCUMENTED IN MEDICAL RECORD: ICD-10-PCS | Mod: CPTII,S$GLB,, | Performed by: INTERNAL MEDICINE

## 2023-04-03 PROCEDURE — 3078F DIAST BP <80 MM HG: CPT | Mod: CPTII,S$GLB,, | Performed by: INTERNAL MEDICINE

## 2023-04-03 PROCEDURE — 99204 PR OFFICE/OUTPT VISIT, NEW, LEVL IV, 45-59 MIN: ICD-10-PCS | Mod: S$GLB,,, | Performed by: INTERNAL MEDICINE

## 2023-04-03 PROCEDURE — 3075F PR MOST RECENT SYSTOLIC BLOOD PRESS GE 130-139MM HG: ICD-10-PCS | Mod: CPTII,S$GLB,, | Performed by: INTERNAL MEDICINE

## 2023-04-03 PROCEDURE — 3078F PR MOST RECENT DIASTOLIC BLOOD PRESSURE < 80 MM HG: ICD-10-PCS | Mod: CPTII,S$GLB,, | Performed by: INTERNAL MEDICINE

## 2023-04-03 PROCEDURE — 99999 PR PBB SHADOW E&M-EST. PATIENT-LVL IV: ICD-10-PCS | Mod: PBBFAC,,, | Performed by: INTERNAL MEDICINE

## 2023-04-03 PROCEDURE — 3008F PR BODY MASS INDEX (BMI) DOCUMENTED: ICD-10-PCS | Mod: CPTII,S$GLB,, | Performed by: INTERNAL MEDICINE

## 2023-04-03 PROCEDURE — 3008F BODY MASS INDEX DOCD: CPT | Mod: CPTII,S$GLB,, | Performed by: INTERNAL MEDICINE

## 2023-04-03 PROCEDURE — 3075F SYST BP GE 130 - 139MM HG: CPT | Mod: CPTII,S$GLB,, | Performed by: INTERNAL MEDICINE

## 2023-04-03 PROCEDURE — 1159F MED LIST DOCD IN RCRD: CPT | Mod: CPTII,S$GLB,, | Performed by: INTERNAL MEDICINE

## 2023-04-03 PROCEDURE — 99999 PR PBB SHADOW E&M-EST. PATIENT-LVL IV: CPT | Mod: PBBFAC,,, | Performed by: INTERNAL MEDICINE

## 2023-04-03 RX ORDER — LEFLUNOMIDE 20 MG/1
20 TABLET ORAL
Qty: 30 TABLET | Refills: 11 | Status: SHIPPED | OUTPATIENT
Start: 2023-04-03 | End: 2023-04-14

## 2023-04-03 RX ORDER — TIZANIDINE 2 MG/1
2 TABLET ORAL NIGHTLY
Qty: 30 TABLET | Refills: 5 | Status: SHIPPED | OUTPATIENT
Start: 2023-04-03 | End: 2023-09-06

## 2023-04-03 NOTE — PROGRESS NOTES
Subjective:       Patient ID: Beatrice Ochoa is a 63 y.o. female.    Chief Complaint: Referral (Referral for Psoriatic arthritis. Patient states that she was treated years ago for it but it she is not on any medications at this time and has been having flare ups. Patient states that her hands are hurting and stiff and her feet have been hurting as well. She has a hand splint but it is hard to wear it  at work . Pain 4/10 at this time. When she flares up the pain is 10/10. )    This is a 63-year-old lady referred to me for evaluation of joint pain.  The patient is known to have psoriasis that comes on and off and currently it is only in her ear canals.  She also mentioned that her condition got worse after she had a COVID infection a year ago and it also got worse after she had her COVID vaccines.  Pt  is complaining of joint pain involving his MCP PIP wrist elbow shoulders hips knees and ankles bilaterally.  Is 3/10 in intensity dull in quality and continuous.  It is associated with a morning stiffness lasting for more than 60 minutes. Pt reports having difficulty maintaining a good night of sleep and this has been associated with myalgia of 3/10 in intensity.  This pain is dull continuous and gets worse mainly at night.  It is associated with fatigue.  No fever no chills no others.        Review of Systems   Constitutional:  Negative for appetite change, chills and fever.   HENT:  Negative for congestion, ear pain, mouth sores, nosebleeds and trouble swallowing.    Eyes:  Negative for photophobia and discharge.   Respiratory:  Negative for chest tightness and shortness of breath.    Cardiovascular:  Negative for chest pain.   Gastrointestinal:  Negative for abdominal pain and vomiting.   Endocrine: Negative.    Genitourinary:  Negative for hematuria.   Musculoskeletal:         As per HPI   Skin:  Positive for rash.   Neurological:  Negative for weakness.       Objective:   /79 (BP Location: Right arm,  "Patient Position: Sitting, BP Method: Large (Automatic))   Pulse 90   Temp 98.6 °F (37 °C) (Oral)   Ht 5' 1" (1.549 m)   Wt 71.9 kg (158 lb 9.6 oz)   SpO2 98%   BMI 29.97 kg/m²      Physical Exam   Constitutional: She is oriented to person, place, and time. She appears well-developed and well-nourished. No distress.   HENT:   Head: Normocephalic and atraumatic.   Right Ear: External ear normal.   Left Ear: External ear normal.   Eyes: Pupils are equal, round, and reactive to light.   Cardiovascular: Normal rate, regular rhythm and normal heart sounds.   Pulmonary/Chest: Breath sounds normal.   Abdominal: Soft. There is no abdominal tenderness.   Musculoskeletal:      Right shoulder: Tenderness present.      Left shoulder: Tenderness present.      Right elbow: Tenderness present.      Left elbow: Tenderness present.      Right wrist: Tenderness present.      Left wrist: Tenderness present.      Cervical back: Neck supple.      Right hip: Tenderness present.      Left hip: Tenderness present.      Right knee: Tenderness present.      Left knee: Tenderness present.      Right ankle: Tenderness present.      Left ankle: Tenderness present.   Lymphadenopathy:     She has no cervical adenopathy.   Neurological: She is alert and oriented to person, place, and time. She displays normal reflexes. No cranial nerve deficit or sensory deficit. She exhibits normal muscle tone. Coordination normal.   Skin: No rash noted. No erythema.   Psoriatic lesions in the ear canals bilaterally   Vitals reviewed.      Right Side Rheumatological Exam     The patient is tender to palpation of the shoulder, elbow, wrist, knee, 1st PIP, 1st MCP, 2nd PIP, 2nd MCP, 3rd PIP, 3rd MCP, 4th PIP, 4th MCP, 5th PIP, hip, ankle, 1st MTP, 2nd MTP, 3rd MTP, 4th MTP, 5th MTP, 1st toe IP, 2nd toe IP, 3rd toe IP, 4th toe IP and 5th toe IP    Left Side Rheumatological Exam     The patient is tender to palpation of the shoulder, elbow, wrist, knee, 1st " PIP, 1st MCP, 2nd PIP, 2nd MCP, 3rd PIP, 3rd MCP, 4th PIP, 4th MCP, 5th PIP, 5th MCP, hip, ankle, 1st MTP, 2nd MTP, 3rd MTP, 4th MTP, 5th MTP, 1st toe IP, 2nd toe IP, 3rd toe IP, 4th toe IP and 5th toe IP.       Completed Fibromyalgia exam 11/18 tender points.  No data to display     Assessment:       1. Insomnia, unspecified type    2. Inflammatory polyarthropathy    3. Psoriasis          Medication List with Changes/Refills   New Medications    LEFLUNOMIDE (ARAVA) 20 MG TAB    Take 1 tablet (20 mg total) by mouth daily with dinner or evening meal.       Start Date: 4/3/2023  End Date: 4/2/2024    TIZANIDINE (ZANAFLEX) 2 MG TABLET    Take 1 tablet (2 mg total) by mouth nightly.       Start Date: 4/3/2023  End Date: 9/30/2023   Current Medications    ASPIRIN (ECOTRIN) 81 MG EC TABLET    Take 81 mg by mouth.       Start Date: --        End Date: --    FENOFIBRATE (TRICOR) 48 MG TABLET    TAKE ONE TABLET BY MOUTH EVERY DAY       Start Date: 10/24/2022End Date: --    METOPROLOL SUCCINATE (TOPROL-XL) 25 MG 24 HR TABLET    TAKE 1/2 TABLET BY MOUTH EVERY DAY: DO NOT CRUSH OR CHEW       Start Date: 10/24/2022End Date: --    MONTELUKAST (SINGULAIR) 10 MG TABLET    TAKE ONE TABLET BY MOUTH EVERY DAY       Start Date: 10/24/2022End Date: --    OLMESARTAN-AMLODIPIN-HCTHIAZID 40-5-12.5 MG TAB    TAKE ONE TABLET BY MOUTH EVERY DAY       Start Date: 11/29/2022End Date: --    PANTOPRAZOLE (PROTONIX) 40 MG TABLET    Take 40 mg by mouth 3 (three) times daily.       Start Date: 10/25/2022End Date: --   Discontinued Medications    ALBUTEROL (VENTOLIN HFA) 90 MCG/ACTUATION INHALER    Inhale 2 puffs into the lungs every 6 (six) hours as needed for Wheezing. Rescue       Start Date: 1/5/2023  End Date: 4/3/2023         Plan:         Problem List Items Addressed This Visit          Immunology/Multi System    Inflammatory polyarthropathy    Relevant Medications    leflunomide (ARAVA) 20 MG Tab    tiZANidine (ZANAFLEX) 2 MG tablet    Other  Relevant Orders    CBC Auto Differential    Comprehensive Metabolic Panel    CRP, High Sensitivity    Vitamin D    Rheumatoid Quantitative    Cyclic Citrullinated Peptide Antibody, IgG    Antinuclear Ab, HEp-2 Substrate    Hepatitis B Surface Antigen    Hepatitis C Antibody    TSH    T4, Free    HLA B27 Antigen     Other Visit Diagnoses       Insomnia, unspecified type    -  Primary    Relevant Medications    leflunomide (ARAVA) 20 MG Tab    tiZANidine (ZANAFLEX) 2 MG tablet    Other Relevant Orders    CBC Auto Differential    Comprehensive Metabolic Panel    CRP, High Sensitivity    Vitamin D    Rheumatoid Quantitative    Cyclic Citrullinated Peptide Antibody, IgG    Antinuclear Ab, HEp-2 Substrate    Hepatitis B Surface Antigen    Hepatitis C Antibody    TSH    T4, Free    HLA B27 Antigen    Psoriasis        Relevant Medications    leflunomide (ARAVA) 20 MG Tab    tiZANidine (ZANAFLEX) 2 MG tablet    Other Relevant Orders    CBC Auto Differential    Comprehensive Metabolic Panel    CRP, High Sensitivity    Vitamin D    Rheumatoid Quantitative    Cyclic Citrullinated Peptide Antibody, IgG    Antinuclear Ab, HEp-2 Substrate    Hepatitis B Surface Antigen    Hepatitis C Antibody    TSH    T4, Free    HLA B27 Antigen

## 2023-04-06 ENCOUNTER — LAB VISIT (OUTPATIENT)
Dept: LAB | Facility: HOSPITAL | Age: 63
End: 2023-04-06
Attending: INTERNAL MEDICINE
Payer: COMMERCIAL

## 2023-04-06 DIAGNOSIS — L40.9 PSORIASIS: ICD-10-CM

## 2023-04-06 DIAGNOSIS — G47.00 INSOMNIA, UNSPECIFIED TYPE: ICD-10-CM

## 2023-04-06 DIAGNOSIS — M06.4 INFLAMMATORY POLYARTHROPATHY: ICD-10-CM

## 2023-04-06 LAB
ALBUMIN SERPL-MCNC: 4.2 G/DL (ref 3.4–4.8)
ALBUMIN/GLOB SERPL: 1.6 RATIO (ref 1.1–2)
ALP SERPL-CCNC: 33 UNIT/L (ref 40–150)
ALT SERPL-CCNC: 19 UNIT/L (ref 0–55)
AST SERPL-CCNC: 22 UNIT/L (ref 5–34)
BASOPHILS # BLD AUTO: 0.03 X10(3)/MCL (ref 0–0.2)
BASOPHILS NFR BLD AUTO: 0.6 %
BILIRUBIN DIRECT+TOT PNL SERPL-MCNC: 0.6 MG/DL
BUN SERPL-MCNC: 28.8 MG/DL (ref 9.8–20.1)
CALCIUM SERPL-MCNC: 9.3 MG/DL (ref 8.4–10.2)
CHLORIDE SERPL-SCNC: 103 MMOL/L (ref 98–107)
CO2 SERPL-SCNC: 28 MMOL/L (ref 23–31)
CREAT SERPL-MCNC: 0.89 MG/DL (ref 0.55–1.02)
CRP SERPL HS-MCNC: 1.17 MG/L
DEPRECATED CALCIDIOL+CALCIFEROL SERPL-MC: 75.8 NG/ML (ref 30–80)
EOSINOPHIL # BLD AUTO: 0.14 X10(3)/MCL (ref 0–0.9)
EOSINOPHIL NFR BLD AUTO: 2.7 %
ERYTHROCYTE [DISTWIDTH] IN BLOOD BY AUTOMATED COUNT: 13 % (ref 11.5–17)
GFR SERPLBLD CREATININE-BSD FMLA CKD-EPI: >60 MLS/MIN/1.73/M2
GLOBULIN SER-MCNC: 2.6 GM/DL (ref 2.4–3.5)
GLUCOSE SERPL-MCNC: 138 MG/DL (ref 82–115)
HCT VFR BLD AUTO: 39.7 % (ref 37–47)
HGB BLD-MCNC: 12.1 G/DL (ref 12–16)
IMM GRANULOCYTES # BLD AUTO: 0.02 X10(3)/MCL (ref 0–0.04)
IMM GRANULOCYTES NFR BLD AUTO: 0.4 %
LYMPHOCYTES # BLD AUTO: 1.54 X10(3)/MCL (ref 0.6–4.6)
LYMPHOCYTES NFR BLD AUTO: 29.2 %
MCH RBC QN AUTO: 26.3 PG (ref 27–31)
MCHC RBC AUTO-ENTMCNC: 30.5 G/DL (ref 33–36)
MCV RBC AUTO: 86.3 FL (ref 80–94)
MONOCYTES # BLD AUTO: 0.33 X10(3)/MCL (ref 0.1–1.3)
MONOCYTES NFR BLD AUTO: 6.3 %
NEUTROPHILS # BLD AUTO: 3.21 X10(3)/MCL (ref 2.1–9.2)
NEUTROPHILS NFR BLD AUTO: 60.8 %
NRBC BLD AUTO-RTO: 0 %
PLATELET # BLD AUTO: 324 X10(3)/MCL (ref 130–400)
PMV BLD AUTO: 9.8 FL (ref 7.4–10.4)
POTASSIUM SERPL-SCNC: 3.2 MMOL/L (ref 3.5–5.1)
PROT SERPL-MCNC: 6.8 GM/DL (ref 5.8–7.6)
RBC # BLD AUTO: 4.6 X10(6)/MCL (ref 4.2–5.4)
RHEUMATOID FACT SERPL-ACNC: 20 IU/ML
SODIUM SERPL-SCNC: 142 MMOL/L (ref 136–145)
T4 FREE SERPL-MCNC: 0.93 NG/DL (ref 0.7–1.48)
TSH SERPL-ACNC: 0.95 UIU/ML (ref 0.35–4.94)
WBC # SPEC AUTO: 5.3 X10(3)/MCL (ref 4.5–11.5)

## 2023-04-06 PROCEDURE — 82306 VITAMIN D 25 HYDROXY: CPT

## 2023-04-06 PROCEDURE — 86431 RHEUMATOID FACTOR QUANT: CPT

## 2023-04-06 PROCEDURE — 86812 HLA TYPING A B OR C: CPT | Mod: 90

## 2023-04-06 PROCEDURE — 86003 ALLG SPEC IGE CRUDE XTRC EA: CPT

## 2023-04-06 PROCEDURE — 86141 C-REACTIVE PROTEIN HS: CPT

## 2023-04-06 PROCEDURE — 80053 COMPREHEN METABOLIC PANEL: CPT

## 2023-04-06 PROCEDURE — 84443 ASSAY THYROID STIM HORMONE: CPT

## 2023-04-06 PROCEDURE — 84439 ASSAY OF FREE THYROXINE: CPT

## 2023-04-06 PROCEDURE — 86039 ANTINUCLEAR ANTIBODIES (ANA): CPT | Mod: 90

## 2023-04-06 PROCEDURE — 85025 COMPLETE CBC W/AUTO DIFF WBC: CPT

## 2023-04-06 PROCEDURE — 86803 HEPATITIS C AB TEST: CPT

## 2023-04-06 PROCEDURE — 87340 HEPATITIS B SURFACE AG IA: CPT

## 2023-04-06 PROCEDURE — 36415 COLL VENOUS BLD VENIPUNCTURE: CPT

## 2023-04-07 LAB
ANA SER QL HEP2 SUBST: NORMAL
HBV SURFACE AG SERPL QL IA: NONREACTIVE
HCV AB SERPL QL IA: NONREACTIVE
HLA TYP COMM-IMP: NORMAL
HLA-B27 QL FC: NEGATIVE

## 2023-04-08 LAB — CYCLIC CITRULLINATED PEPTIDE (CCP) (OLG): NEGATIVE

## 2023-04-14 ENCOUNTER — TELEPHONE (OUTPATIENT)
Dept: RHEUMATOLOGY | Facility: CLINIC | Age: 63
End: 2023-04-14
Payer: COMMERCIAL

## 2023-04-14 DIAGNOSIS — M06.4 INFLAMMATORY POLYARTHROPATHY: Primary | ICD-10-CM

## 2023-04-14 RX ORDER — PREDNISONE 5 MG/1
5 TABLET ORAL DAILY
Qty: 30 TABLET | Refills: 5 | Status: SHIPPED | OUTPATIENT
Start: 2023-04-14 | End: 2023-05-08

## 2023-04-14 NOTE — TELEPHONE ENCOUNTER
----- Message from Francheska Anders sent at 4/14/2023 10:26 AM CDT -----  Regarding: Reactions  Patient called stating she is experiencing itching, tingling in her mouth and on face. Her skin also feels like it is crawling. She is on a muscle relaxer and anti-inflammation medication. Please Advise

## 2023-04-14 NOTE — TELEPHONE ENCOUNTER
Spoke with patient she is wanting to know if something else could be prescribed other than the steroid. It keeps her awake and elevates her blood pressure. Please Advise.Thanks

## 2023-04-17 RX ORDER — HYDROXYCHLOROQUINE SULFATE 200 MG/1
200 TABLET, FILM COATED ORAL 2 TIMES DAILY
Qty: 60 TABLET | Refills: 5 | Status: SHIPPED | OUTPATIENT
Start: 2023-04-17 | End: 2023-09-06 | Stop reason: SDUPTHER

## 2023-04-17 NOTE — TELEPHONE ENCOUNTER
Yes I understand.  We can get her on Plaquenil 200 mg twice daily instead off the prednisone.  This is a safe medication.  Thank you B[facility]

## 2023-05-08 ENCOUNTER — OFFICE VISIT (OUTPATIENT)
Dept: FAMILY MEDICINE | Facility: CLINIC | Age: 63
End: 2023-05-08
Payer: COMMERCIAL

## 2023-05-08 VITALS
BODY MASS INDEX: 29.36 KG/M2 | HEART RATE: 94 BPM | SYSTOLIC BLOOD PRESSURE: 110 MMHG | WEIGHT: 155.38 LBS | DIASTOLIC BLOOD PRESSURE: 75 MMHG | OXYGEN SATURATION: 97 %

## 2023-05-08 DIAGNOSIS — M06.4 INFLAMMATORY POLYARTHROPATHY: ICD-10-CM

## 2023-05-08 DIAGNOSIS — Z79.899 DRUG-INDUCED IMMUNODEFICIENCY: ICD-10-CM

## 2023-05-08 DIAGNOSIS — I10 PRIMARY HYPERTENSION: Primary | ICD-10-CM

## 2023-05-08 DIAGNOSIS — E78.49 ESSENTIAL FAMILIAL HYPERLIPIDEMIA: ICD-10-CM

## 2023-05-08 DIAGNOSIS — D84.821 DRUG-INDUCED IMMUNODEFICIENCY: ICD-10-CM

## 2023-05-08 DIAGNOSIS — Z86.39 HISTORY OF IRON DEFICIENCY: ICD-10-CM

## 2023-05-08 DIAGNOSIS — G43.709 CHRONIC MIGRAINE WITHOUT AURA WITHOUT STATUS MIGRAINOSUS, NOT INTRACTABLE: ICD-10-CM

## 2023-05-08 PROCEDURE — 3074F PR MOST RECENT SYSTOLIC BLOOD PRESSURE < 130 MM HG: ICD-10-PCS | Mod: CPTII,,, | Performed by: FAMILY MEDICINE

## 2023-05-08 PROCEDURE — 1160F PR REVIEW ALL MEDS BY PRESCRIBER/CLIN PHARMACIST DOCUMENTED: ICD-10-PCS | Mod: CPTII,,, | Performed by: FAMILY MEDICINE

## 2023-05-08 PROCEDURE — 3078F PR MOST RECENT DIASTOLIC BLOOD PRESSURE < 80 MM HG: ICD-10-PCS | Mod: CPTII,,, | Performed by: FAMILY MEDICINE

## 2023-05-08 PROCEDURE — 99214 OFFICE O/P EST MOD 30 MIN: CPT | Mod: ,,, | Performed by: FAMILY MEDICINE

## 2023-05-08 PROCEDURE — 1160F RVW MEDS BY RX/DR IN RCRD: CPT | Mod: CPTII,,, | Performed by: FAMILY MEDICINE

## 2023-05-08 PROCEDURE — 1159F PR MEDICATION LIST DOCUMENTED IN MEDICAL RECORD: ICD-10-PCS | Mod: CPTII,,, | Performed by: FAMILY MEDICINE

## 2023-05-08 PROCEDURE — 3008F PR BODY MASS INDEX (BMI) DOCUMENTED: ICD-10-PCS | Mod: CPTII,,, | Performed by: FAMILY MEDICINE

## 2023-05-08 PROCEDURE — 3074F SYST BP LT 130 MM HG: CPT | Mod: CPTII,,, | Performed by: FAMILY MEDICINE

## 2023-05-08 PROCEDURE — 3078F DIAST BP <80 MM HG: CPT | Mod: CPTII,,, | Performed by: FAMILY MEDICINE

## 2023-05-08 PROCEDURE — 3008F BODY MASS INDEX DOCD: CPT | Mod: CPTII,,, | Performed by: FAMILY MEDICINE

## 2023-05-08 PROCEDURE — 99214 PR OFFICE/OUTPT VISIT, EST, LEVL IV, 30-39 MIN: ICD-10-PCS | Mod: ,,, | Performed by: FAMILY MEDICINE

## 2023-05-08 PROCEDURE — 1159F MED LIST DOCD IN RCRD: CPT | Mod: CPTII,,, | Performed by: FAMILY MEDICINE

## 2023-05-08 RX ORDER — ONDANSETRON 4 MG/1
4 TABLET, ORALLY DISINTEGRATING ORAL EVERY 8 HOURS PRN
Qty: 30 TABLET | Refills: 1 | Status: SHIPPED | OUTPATIENT
Start: 2023-05-08

## 2023-05-08 RX ORDER — SUMATRIPTAN 50 MG/1
50 TABLET, FILM COATED ORAL
Qty: 18 TABLET | Refills: 3 | Status: SHIPPED | OUTPATIENT
Start: 2023-05-08 | End: 2023-09-06

## 2023-05-08 NOTE — PROGRESS NOTES
Patient ID: [id number]     Chief Complaint: Hyperlipidemia        HPI:     Beatrice Ochoa is a 63 y.o. female here today for a follow up HTN, HLD.  - HTN is controlled with Rx, no side effects, asymptomatic, she does followup with Cardiology (Dr. Watkins) as scheduled.  - HLD is controlled with Rx, no side effects, asymptomatic, she is due for repeat labs today.   - She has inflammatory polyarthritis.drug- induced immunodeficiency, stable with Rx, still with joint pain, followed by Rheumatology (Dr. Pace), she is UTD on annual eye exam.  - She reports a history of iron deficiency and would like her iron levels checked.  - Migraine headaches are controlled with Imitrex 50mg p.r.n. and Zofran ODT p.r.n., no side effects, she needs Rx refill of both today, asymptomatic.   - Patient is without any other complaints today.         ----------------------------  Hypertension  Hypertriglyceridemia  Insomnia  Migraine  Obesity, unspecified  Psoriasis     Past Surgical History:   Procedure Laterality Date     SECTION      CHOLECYSTECTOMY      FRACTURE SURGERY      HYSTERECTOMY      TONSILLECTOMY         Review of patient's allergies indicates:   Allergen Reactions    Codeine Itching     severe    Neosporin g.u. irrigant     Oxycodone-acetaminophen Itching     Severe itching    Zolmitriptan     Levofloxacin Rash       Outpatient Medications Marked as Taking for the 23 encounter (Office Visit) with Loretta Gillette MD   Medication Sig Dispense Refill    aspirin (ECOTRIN) 81 MG EC tablet Take 81 mg by mouth.      fenofibrate (TRICOR) 48 MG tablet TAKE ONE TABLET BY MOUTH EVERY DAY 90 tablet 3    hydrOXYchloroQUINE (PLAQUENIL) 200 mg tablet Take 1 tablet (200 mg total) by mouth 2 (two) times daily. After food 60 tablet 5    metoprolol succinate (TOPROL-XL) 25 MG 24 hr tablet TAKE 1/2 TABLET BY MOUTH EVERY DAY: DO NOT CRUSH OR CHEW 45 tablet 3    montelukast (SINGULAIR) 10 mg tablet TAKE ONE TABLET BY MOUTH  EVERY DAY 90 tablet 3    olmesartan-amLODIPin-hcthiazid 40-5-12.5 mg Tab TAKE ONE TABLET BY MOUTH EVERY DAY 90 tablet 3    pantoprazole (PROTONIX) 40 MG tablet Take 40 mg by mouth 3 (three) times daily.      tiZANidine (ZANAFLEX) 2 MG tablet Take 1 tablet (2 mg total) by mouth nightly. 30 tablet 5       Social History     Socioeconomic History    Marital status:    Tobacco Use    Smoking status: Never    Smokeless tobacco: Never   Substance and Sexual Activity    Alcohol use: Not Currently    Drug use: Never    Sexual activity: Yes     Partners: Female        Family History   Problem Relation Age of Onset    Arthritis Mother     Hyperlipidemia Mother     Heart disease Father     Stroke Father         Subjective:       Review of Systems:    See HPI for details    Constitutional: Denies Change in appetite. Denies Chills. Denies Fever. Denies Night sweats.  Eye: Denies Blurred vision. Denies Discharge. Denies Eye pain.  ENT: Denies Decreased hearing. Denies Sore throat. Denies Swollen glands.  Respiratory: Denies Cough. Denies Shortness of breath. Denies Shortness of breath with exertion. Denies Wheezing.  Cardiovascular: Denies Chest pain at rest. Denies Chest pain with exertion. Denies Irregular heartbeat. Denies Palpitations.  Gastrointestinal: Denies Abdominal pain. Denies Diarrhea. Denies Nausea. Denies Vomiting. Denies Hematemesis or Hematochezia.  Genitourinary: Denies Dysuria. Denies Urinary frequency. Denies Urinary urgency. Denies Blood in urine.  Endocrine: Denies Cold intolerance. Denies Excessive thirst. Denies Heat intolerance. Denies Weight loss. Denies Weight gain.  Musculoskeletal: Reports Painful joints. Denies Weakness.  Integumentary: Denies Rash. Denies Itching. Denies Dry skin.  Neurologic: Denies Dizziness. Denies Fainting. Denies Headache.  Psychiatric: Denies Depression. Denies Anxiety. Denies Suicidal/Homicidal ideations.    All Other ROS: Negative except as stated in HPI.        Objective:     /75 (BP Location: Right arm, Patient Position: Sitting, BP Method: Medium (Automatic))   Pulse 94   Wt 70.5 kg (155 lb 6.4 oz)   SpO2 97%   BMI 29.36 kg/m²     Physical Exam    General: Alert and oriented, No acute distress. Overweight.   Head: Normocephalic, Atraumatic.  Eye: Pupils are equal, round and reactive to light, Extraocular movements are intact, Sclera non-icteric.  Ears/Nose/Throat: Normal, Mucosa moist,Clear.  Neck/Thyroid: Supple, Non-tender, No carotid bruit, No palpable thyromegaly or thyroid nodule, No lymphadenopathy, No JVD, Full range of motion.  Respiratory: Clear to auscultation bilaterally; No wheezes, rales or rhonchi,Non-labored respirations, Symmetrical chest wall expansion.  Cardiovascular: Regular rate and rhythm, S1/S2 normal, No murmurs, rubs or gallops.  Gastrointestinal: Soft, Non-tender, Non-distended, Normal bowel sounds, No palpable organomegaly.  Musculoskeletal: Normal range of motion.  Integumentary: Warm, Dry, Intact, No suspicious lesions or rashes.  Extremities: No clubbing, cyanosis or edema  Neurologic: No focal deficits, Cranial Nerves II-XII are grossly intact, Motor strength normal upper and lower extremities, Sensory exam intact.  Psychiatric: Normal interaction, Coherent speech, Euthymic mood, Appropriate affect     *Lab results from 04/06/2023 were reviewed and discussed with patient and patient voices understanding.*    Assessment:       ICD-10-CM ICD-9-CM   1. Primary hypertension  I10 401.9   2. Essential familial hyperlipidemia  E78.49 272.2   3. Inflammatory polyarthropathy  M06.4 714.9   4. Drug-induced immunodeficiency  D84.821 279.3    Z79.899 E947.9   5. History of iron deficiency  Z86.39 V12.3   6. Chronic migraine without aura without status migrainosus, not intractable  G43.709 346.70        Plan:     Problem List Items Addressed This Visit          Cardiac/Vascular    Primary hypertension - Primary    Relevant Orders    CBC  Auto Differential    Comprehensive Metabolic Panel       Immunology/Multi System    Inflammatory polyarthropathy     Other Visit Diagnoses       Essential familial hyperlipidemia        Relevant Orders    Comprehensive Metabolic Panel    Lipid Panel    Drug-induced immunodeficiency        History of iron deficiency        Relevant Orders    CBC Auto Differential    Iron and TIBC    Chronic migraine without aura without status migrainosus, not intractable        Relevant Medications    sumatriptan (IMITREX) 50 MG tablet    ondansetron (ZOFRAN-ODT) 4 MG TbDL         1. Primary hypertension  - CBC Auto Differential; Future  - Comprehensive Metabolic Panel; Future  - BP is well controlled. Keep daily BP log. Continue followup with Cardiology as scheduled. Will titrate BP Rx until BP is <140/90. Notify M.D. or ER if BP >170/100 or <90/60, chest pain, palpitations, headache, SOB, temp greater than 100.4, or any acute illness.   Continue  Low Sodium Diet (DASH Diet - Less than 2 grams of sodium per day).  Monitor blood pressure daily and log. Report consistent numbers greater than 140/90.  Smoking cessation encouraged to aid in BP reduction.  Maintain healthy weight with goal BMI <30. Exercise 30 minutes per day, 5 days per week.      2. Essential familial hyperlipidemia  - Comprehensive Metabolic Panel; Future  - Lipid Panel; Future  - Will treat pending results. Continue Rx as prescribed for now.   Continue  Stressed importance of dietary modifications. Follow a low cholesterol, low saturated fat diet with less that 200mg of cholesterol a day.  Avoid fried foods and high saturated fats (high saturated fats less than 7% of calories).  Add Flax Seed/Fish Oil supplements to diet. Increase dietary fiber.  Regular exercise can reduce LDL and raise HDL. Stressed importance of physical activity 5 times per week for 30 minutes per day.      3. Inflammatory polyarthropathy  - Stable, continue current treatment plan, continue  followup with Rheumatology.     4. Drug-induced immunodeficiency  - Stable, continue current treatment plan, continue followup with Rheumatology.     5. History of iron deficiency  - CBC Auto Differential; Future  - Iron and TIBC; Future  - Will treat pending results.     6. Chronic migraine without aura without status migrainosus, not intractable  - Rx sumatriptan (IMITREX) 50 MG tablet; Take 1 tablet (50 mg total) by mouth every 2 (two) hours as needed for Migraine.  Dispense: 18 tablet; Refill: 3 refilled today  - Rx ondansetron (ZOFRAN-ODT) 4 MG TbDL; Take 1 tablet (4 mg total) by mouth every 8 (eight) hours as needed (nausea/vomiting).  Dispense: 30 tablet; Refill: 1 refilled today  - Continue current treatment plan. Notify M.D. or ER if symptoms persist or worsen, temp >100.4, refractory headache, vomiting, visual changes, or any acute illness.          Beatrice was seen today for hyperlipidemia.    Diagnoses and all orders for this visit:    Primary hypertension  -     CBC Auto Differential; Future  -     Comprehensive Metabolic Panel; Future    Essential familial hyperlipidemia  -     Comprehensive Metabolic Panel; Future  -     Lipid Panel; Future    Inflammatory polyarthropathy    Drug-induced immunodeficiency    History of iron deficiency  -     CBC Auto Differential; Future  -     Iron and TIBC; Future    Chronic migraine without aura without status migrainosus, not intractable  -     sumatriptan (IMITREX) 50 MG tablet; Take 1 tablet (50 mg total) by mouth every 2 (two) hours as needed for Migraine.  -     ondansetron (ZOFRAN-ODT) 4 MG TbDL; Take 1 tablet (4 mg total) by mouth every 8 (eight) hours as needed (nausea/vomiting).          Medication List with Changes/Refills   New Medications    ONDANSETRON (ZOFRAN-ODT) 4 MG TBDL    Take 1 tablet (4 mg total) by mouth every 8 (eight) hours as needed (nausea/vomiting).       Start Date: 5/8/2023  End Date: --    SUMATRIPTAN (IMITREX) 50 MG TABLET    Take 1  tablet (50 mg total) by mouth every 2 (two) hours as needed for Migraine.       Start Date: 5/8/2023  End Date: 6/7/2023   Current Medications    ASPIRIN (ECOTRIN) 81 MG EC TABLET    Take 81 mg by mouth.       Start Date: --        End Date: --    FENOFIBRATE (TRICOR) 48 MG TABLET    TAKE ONE TABLET BY MOUTH EVERY DAY       Start Date: 10/24/2022End Date: --    HYDROXYCHLOROQUINE (PLAQUENIL) 200 MG TABLET    Take 1 tablet (200 mg total) by mouth 2 (two) times daily. After food       Start Date: 4/17/2023 End Date: 10/18/2023    METOPROLOL SUCCINATE (TOPROL-XL) 25 MG 24 HR TABLET    TAKE 1/2 TABLET BY MOUTH EVERY DAY: DO NOT CRUSH OR CHEW       Start Date: 10/24/2022End Date: --    MONTELUKAST (SINGULAIR) 10 MG TABLET    TAKE ONE TABLET BY MOUTH EVERY DAY       Start Date: 10/24/2022End Date: --    OLMESARTAN-AMLODIPIN-HCTHIAZID 40-5-12.5 MG TAB    TAKE ONE TABLET BY MOUTH EVERY DAY       Start Date: 11/29/2022End Date: --    PANTOPRAZOLE (PROTONIX) 40 MG TABLET    Take 40 mg by mouth 3 (three) times daily.       Start Date: 10/25/2022End Date: --    PREDNISONE (DELTASONE) 5 MG TABLET    Take 1 tablet (5 mg total) by mouth once daily. AFTER BREAKFAST       Start Date: 4/14/2023 End Date: --    TIZANIDINE (ZANAFLEX) 2 MG TABLET    Take 1 tablet (2 mg total) by mouth nightly.       Start Date: 4/3/2023  End Date: 9/30/2023          Follow up in about 6 months (around 11/8/2023) for Wellness.

## 2023-07-11 DIAGNOSIS — I10 PRIMARY HYPERTENSION: Primary | ICD-10-CM

## 2023-07-11 RX ORDER — METOPROLOL SUCCINATE 25 MG/1
TABLET, EXTENDED RELEASE ORAL
Qty: 45 TABLET | Refills: 3 | Status: SHIPPED | OUTPATIENT
Start: 2023-07-11

## 2023-07-24 ENCOUNTER — LAB VISIT (OUTPATIENT)
Dept: LAB | Facility: HOSPITAL | Age: 63
End: 2023-07-24
Attending: FAMILY MEDICINE
Payer: COMMERCIAL

## 2023-07-24 ENCOUNTER — PATIENT MESSAGE (OUTPATIENT)
Dept: FAMILY MEDICINE | Facility: CLINIC | Age: 63
End: 2023-07-24
Payer: COMMERCIAL

## 2023-07-24 DIAGNOSIS — E78.49 ESSENTIAL FAMILIAL HYPERLIPIDEMIA: ICD-10-CM

## 2023-07-24 DIAGNOSIS — I10 PRIMARY HYPERTENSION: ICD-10-CM

## 2023-07-24 DIAGNOSIS — Z86.39 HISTORY OF IRON DEFICIENCY: ICD-10-CM

## 2023-07-24 LAB
ALBUMIN SERPL-MCNC: 3.9 G/DL (ref 3.4–4.8)
ALBUMIN/GLOB SERPL: 1.3 RATIO (ref 1.1–2)
ALP SERPL-CCNC: 28 UNIT/L (ref 40–150)
ALT SERPL-CCNC: 17 UNIT/L (ref 0–55)
AST SERPL-CCNC: 22 UNIT/L (ref 5–34)
BASOPHILS # BLD AUTO: 0.04 X10(3)/MCL
BASOPHILS NFR BLD AUTO: 0.9 %
BILIRUBIN DIRECT+TOT PNL SERPL-MCNC: 0.5 MG/DL
BUN SERPL-MCNC: 23 MG/DL (ref 9.8–20.1)
CALCIUM SERPL-MCNC: 9.4 MG/DL (ref 8.4–10.2)
CHLORIDE SERPL-SCNC: 104 MMOL/L (ref 98–107)
CHOLEST SERPL-MCNC: 150 MG/DL
CHOLEST/HDLC SERPL: 2 {RATIO} (ref 0–5)
CO2 SERPL-SCNC: 30 MMOL/L (ref 23–31)
CREAT SERPL-MCNC: 0.93 MG/DL (ref 0.55–1.02)
EOSINOPHIL # BLD AUTO: 0.13 X10(3)/MCL (ref 0–0.9)
EOSINOPHIL NFR BLD AUTO: 2.8 %
ERYTHROCYTE [DISTWIDTH] IN BLOOD BY AUTOMATED COUNT: 12.5 % (ref 11.5–17)
GFR SERPLBLD CREATININE-BSD FMLA CKD-EPI: >60 MLS/MIN/1.73/M2
GLOBULIN SER-MCNC: 3 GM/DL (ref 2.4–3.5)
GLUCOSE SERPL-MCNC: 94 MG/DL (ref 82–115)
HCT VFR BLD AUTO: 37.7 % (ref 37–47)
HDLC SERPL-MCNC: 65 MG/DL (ref 35–60)
HGB BLD-MCNC: 11.8 G/DL (ref 12–16)
IMM GRANULOCYTES # BLD AUTO: 0.01 X10(3)/MCL (ref 0–0.04)
IMM GRANULOCYTES NFR BLD AUTO: 0.2 %
IRON SATN MFR SERPL: 19 % (ref 20–50)
IRON SERPL-MCNC: 70 UG/DL (ref 50–170)
LDLC SERPL CALC-MCNC: 69 MG/DL (ref 50–140)
LYMPHOCYTES # BLD AUTO: 1.19 X10(3)/MCL (ref 0.6–4.6)
LYMPHOCYTES NFR BLD AUTO: 25.3 %
MCH RBC QN AUTO: 26.5 PG (ref 27–31)
MCHC RBC AUTO-ENTMCNC: 31.3 G/DL (ref 33–36)
MCV RBC AUTO: 84.5 FL (ref 80–94)
MONOCYTES # BLD AUTO: 0.38 X10(3)/MCL (ref 0.1–1.3)
MONOCYTES NFR BLD AUTO: 8.1 %
NEUTROPHILS # BLD AUTO: 2.95 X10(3)/MCL (ref 2.1–9.2)
NEUTROPHILS NFR BLD AUTO: 62.7 %
NRBC BLD AUTO-RTO: 0 %
PLATELET # BLD AUTO: 291 X10(3)/MCL (ref 130–400)
PMV BLD AUTO: 8.9 FL (ref 7.4–10.4)
POTASSIUM SERPL-SCNC: 3.5 MMOL/L (ref 3.5–5.1)
PROT SERPL-MCNC: 6.9 GM/DL (ref 5.8–7.6)
RBC # BLD AUTO: 4.46 X10(6)/MCL (ref 4.2–5.4)
SODIUM SERPL-SCNC: 144 MMOL/L (ref 136–145)
TIBC SERPL-MCNC: 298 UG/DL (ref 70–310)
TIBC SERPL-MCNC: 368 UG/DL (ref 250–450)
TRANSFERRIN SERPL-MCNC: 335 MG/DL (ref 173–360)
TRIGL SERPL-MCNC: 79 MG/DL (ref 37–140)
VLDLC SERPL CALC-MCNC: 16 MG/DL
WBC # SPEC AUTO: 4.7 X10(3)/MCL (ref 4.5–11.5)

## 2023-07-24 PROCEDURE — 83550 IRON BINDING TEST: CPT

## 2023-07-24 PROCEDURE — 80053 COMPREHEN METABOLIC PANEL: CPT

## 2023-07-24 PROCEDURE — 80061 LIPID PANEL: CPT

## 2023-07-24 PROCEDURE — 85025 COMPLETE CBC W/AUTO DIFF WBC: CPT

## 2023-07-24 PROCEDURE — 36415 COLL VENOUS BLD VENIPUNCTURE: CPT

## 2023-09-05 RX ORDER — OLMESARTAN MEDOXOMIL, AMLODIPINE AND HYDROCHLOROTHIAZIDE TABLET 40/5/12.5 MG 40; 5; 12.5 MG/1; MG/1; MG/1
TABLET ORAL
Qty: 90 TABLET | Refills: 3 | Status: SHIPPED | OUTPATIENT
Start: 2023-09-05

## 2023-09-06 ENCOUNTER — OFFICE VISIT (OUTPATIENT)
Dept: RHEUMATOLOGY | Facility: CLINIC | Age: 63
End: 2023-09-06
Payer: COMMERCIAL

## 2023-09-06 ENCOUNTER — LAB REQUISITION (OUTPATIENT)
Dept: LAB | Facility: HOSPITAL | Age: 63
End: 2023-09-06
Payer: COMMERCIAL

## 2023-09-06 ENCOUNTER — TELEPHONE (OUTPATIENT)
Dept: RHEUMATOLOGY | Facility: CLINIC | Age: 63
End: 2023-09-06

## 2023-09-06 VITALS
WEIGHT: 153 LBS | OXYGEN SATURATION: 98 % | TEMPERATURE: 99 F | DIASTOLIC BLOOD PRESSURE: 79 MMHG | HEIGHT: 61 IN | SYSTOLIC BLOOD PRESSURE: 127 MMHG | BODY MASS INDEX: 28.89 KG/M2 | HEART RATE: 83 BPM

## 2023-09-06 DIAGNOSIS — I10 PRIMARY HYPERTENSION: ICD-10-CM

## 2023-09-06 DIAGNOSIS — M79.7 FIBROMYALGIA SYNDROME: ICD-10-CM

## 2023-09-06 DIAGNOSIS — L40.9 PSORIASIS: ICD-10-CM

## 2023-09-06 DIAGNOSIS — L40.50 PSORIATIC ARTHRITIS: ICD-10-CM

## 2023-09-06 DIAGNOSIS — G47.00 INSOMNIA, UNSPECIFIED TYPE: ICD-10-CM

## 2023-09-06 DIAGNOSIS — G43.909 MIGRAINE WITHOUT STATUS MIGRAINOSUS, NOT INTRACTABLE, UNSPECIFIED MIGRAINE TYPE: ICD-10-CM

## 2023-09-06 DIAGNOSIS — L40.50 PSORIATIC ARTHRITIS: Primary | ICD-10-CM

## 2023-09-06 DIAGNOSIS — J32.0 CHRONIC MAXILLARY SINUSITIS: ICD-10-CM

## 2023-09-06 PROCEDURE — 3008F PR BODY MASS INDEX (BMI) DOCUMENTED: ICD-10-PCS | Mod: CPTII,S$GLB,, | Performed by: INTERNAL MEDICINE

## 2023-09-06 PROCEDURE — 1160F RVW MEDS BY RX/DR IN RCRD: CPT | Mod: CPTII,S$GLB,, | Performed by: INTERNAL MEDICINE

## 2023-09-06 PROCEDURE — 87070 CULTURE OTHR SPECIMN AEROBIC: CPT | Performed by: OTOLARYNGOLOGY

## 2023-09-06 PROCEDURE — 3078F PR MOST RECENT DIASTOLIC BLOOD PRESSURE < 80 MM HG: ICD-10-PCS | Mod: CPTII,S$GLB,, | Performed by: INTERNAL MEDICINE

## 2023-09-06 PROCEDURE — 87205 SMEAR GRAM STAIN: CPT | Performed by: OTOLARYNGOLOGY

## 2023-09-06 PROCEDURE — 3078F DIAST BP <80 MM HG: CPT | Mod: CPTII,S$GLB,, | Performed by: INTERNAL MEDICINE

## 2023-09-06 PROCEDURE — 1159F PR MEDICATION LIST DOCUMENTED IN MEDICAL RECORD: ICD-10-PCS | Mod: CPTII,S$GLB,, | Performed by: INTERNAL MEDICINE

## 2023-09-06 PROCEDURE — 3074F SYST BP LT 130 MM HG: CPT | Mod: CPTII,S$GLB,, | Performed by: INTERNAL MEDICINE

## 2023-09-06 PROCEDURE — 87102 FUNGUS ISOLATION CULTURE: CPT | Performed by: OTOLARYNGOLOGY

## 2023-09-06 PROCEDURE — 1160F PR REVIEW ALL MEDS BY PRESCRIBER/CLIN PHARMACIST DOCUMENTED: ICD-10-PCS | Mod: CPTII,S$GLB,, | Performed by: INTERNAL MEDICINE

## 2023-09-06 PROCEDURE — 99214 OFFICE O/P EST MOD 30 MIN: CPT | Mod: S$GLB,,, | Performed by: INTERNAL MEDICINE

## 2023-09-06 PROCEDURE — 3074F PR MOST RECENT SYSTOLIC BLOOD PRESSURE < 130 MM HG: ICD-10-PCS | Mod: CPTII,S$GLB,, | Performed by: INTERNAL MEDICINE

## 2023-09-06 PROCEDURE — 99999 PR PBB SHADOW E&M-EST. PATIENT-LVL IV: ICD-10-PCS | Mod: PBBFAC,,, | Performed by: INTERNAL MEDICINE

## 2023-09-06 PROCEDURE — 1159F MED LIST DOCD IN RCRD: CPT | Mod: CPTII,S$GLB,, | Performed by: INTERNAL MEDICINE

## 2023-09-06 PROCEDURE — 3008F BODY MASS INDEX DOCD: CPT | Mod: CPTII,S$GLB,, | Performed by: INTERNAL MEDICINE

## 2023-09-06 PROCEDURE — 99999 PR PBB SHADOW E&M-EST. PATIENT-LVL IV: CPT | Mod: PBBFAC,,, | Performed by: INTERNAL MEDICINE

## 2023-09-06 PROCEDURE — 99214 PR OFFICE/OUTPT VISIT, EST, LEVL IV, 30-39 MIN: ICD-10-PCS | Mod: S$GLB,,, | Performed by: INTERNAL MEDICINE

## 2023-09-06 RX ORDER — PANTOPRAZOLE SODIUM 40 MG/1
40 TABLET, DELAYED RELEASE ORAL 2 TIMES DAILY
Qty: 180 TABLET | Refills: 3 | Status: SHIPPED | OUTPATIENT
Start: 2023-09-06

## 2023-09-06 RX ORDER — HYDROXYCHLOROQUINE SULFATE 200 MG/1
200 TABLET, FILM COATED ORAL 2 TIMES DAILY
Qty: 180 TABLET | Refills: 3 | Status: SHIPPED | OUTPATIENT
Start: 2023-09-06 | End: 2024-08-31

## 2023-09-06 RX ORDER — PANTOPRAZOLE SODIUM 40 MG/1
40 TABLET, DELAYED RELEASE ORAL 3 TIMES DAILY
Qty: 90 TABLET | Refills: 3 | Status: SHIPPED | OUTPATIENT
Start: 2023-09-06 | End: 2023-09-06 | Stop reason: SDUPTHER

## 2023-09-06 NOTE — TELEPHONE ENCOUNTER
The patient states that her inc will not cover her protonix 3 times a day. She sates that they only cover it if it is twice daily. Please advise.

## 2023-09-06 NOTE — PROGRESS NOTES
"Subjective:       Patient ID: Beatrice Ochoa is a 63 y.o. female.    Chief Complaint: Follow-up (Follow up. Patient complains of sinus issues but is on antibiotics. Patient complains of minimal pain in the right thumb area. Pain 2/10.)    The patient is complaining of joint pain involving the MCP PIP wrist elbow shoulders hips knees and ankles bilaterally.  The pain is 2/10 in intensity dull in quality and continuous.  That is associated with a morning stiffness lasting for more than 60 minutes.  Is also having difficulty maintaining a good night of sleep.  This has been associated with myalgias.  Muscle aches are 2/10 in intensity dull in quality and continuous.  They are associated with fatigue.  No fever no chills no others.  Labs checked during this visit   Psoriasis covering 5% of BSA      Review of Systems   Constitutional:  Negative for appetite change, chills and fever.   HENT:  Negative for congestion, ear pain, mouth sores, nosebleeds and trouble swallowing.    Eyes:  Negative for photophobia and discharge.   Respiratory:  Negative for chest tightness and shortness of breath.    Cardiovascular:  Negative for chest pain.   Gastrointestinal:  Negative for abdominal pain and vomiting.   Endocrine: Negative.    Genitourinary:  Negative for hematuria.   Musculoskeletal:         As per HPI   Skin:  Negative for rash.        Psoriasis covering 5% of BSA   Neurological:  Negative for weakness.         Objective:   /79 (BP Location: Left arm, Patient Position: Sitting, BP Method: Medium (Automatic))   Pulse 83   Temp 98.7 °F (37.1 °C) (Oral)   Ht 5' 1" (1.549 m)   Wt 69.4 kg (153 lb)   SpO2 98%   BMI 28.91 kg/m²      Physical Exam   Constitutional: She is oriented to person, place, and time. She appears well-developed and well-nourished. No distress.   HENT:   Head: Normocephalic and atraumatic.   Right Ear: External ear normal.   Left Ear: External ear normal.   Eyes: Pupils are equal, round, and " reactive to light.   Cardiovascular: Normal rate, regular rhythm and normal heart sounds.   Pulmonary/Chest: Breath sounds normal.   Abdominal: Soft. There is no abdominal tenderness.   Musculoskeletal:      Right shoulder: Normal.      Left shoulder: Normal.      Right elbow: Normal.      Left elbow: Normal.      Right wrist: Normal.      Left wrist: Normal.      Cervical back: Neck supple.      Right hip: Normal.      Left hip: Normal.      Right knee: Normal.      Left knee: Normal.   Lymphadenopathy:     She has no cervical adenopathy.   Neurological: She is alert and oriented to person, place, and time. She displays normal reflexes. No cranial nerve deficit or sensory deficit. She exhibits normal muscle tone. Coordination normal.   Skin: No rash noted. No erythema.   Psoriasis covering 5% of BSA   Vitals reviewed.      Right Side Rheumatological Exam     Examination finds the shoulder, elbow, wrist, knee, 1st PIP, 1st MCP, 2nd PIP, 2nd MCP, 3rd PIP, 3rd MCP, 4th PIP, 4th MCP, 5th PIP, 5th MCP, temporomandibular, hip, ankle, 1st MTP, 2nd MTP, 3rd MTP, 4th MTP and 5th MTP normal.    Left Side Rheumatological Exam     Examination finds the shoulder, elbow, wrist, knee, 1st PIP, 1st MCP, 2nd PIP, 2nd MCP, 3rd PIP, 3rd MCP, 4th PIP, 4th MCP, 5th PIP, 5th MCP, temporomandibular, hip, ankle, 1st MTP, 2nd MTP, 3rd MTP, 4th MTP and 5th MTP normal.         Completed Fibromyalgia exam 11/18 tender points.  No data to display     Assessment:       1. Psoriatic arthritis    2. Psoriasis    3. Primary hypertension    4. Insomnia, unspecified type    5. Fibromyalgia syndrome    6. Migraine without status migrainosus, not intractable, unspecified migraine type          Medication List with Changes/Refills   Current Medications    ASPIRIN (ECOTRIN) 81 MG EC TABLET    Take 81 mg by mouth.       Start Date: --        End Date: --    FENOFIBRATE (TRICOR) 48 MG TABLET    TAKE ONE TABLET BY MOUTH EVERY DAY       Start Date:  10/24/2022End Date: --    METOPROLOL SUCCINATE (TOPROL-XL) 25 MG 24 HR TABLET    TAKE 1/2 TABLET BY MOUTH EVERY DAY: DO NOT CRUSH OR CHEW       Start Date: 7/11/2023 End Date: --    MONTELUKAST (SINGULAIR) 10 MG TABLET    TAKE ONE TABLET BY MOUTH EVERY DAY       Start Date: 10/24/2022End Date: --    OLMESARTAN-AMLODIPIN-HCTHIAZID 40-5-12.5 MG TAB    TAKE ONE TABLET BY MOUTH EVERY DAY       Start Date: 9/5/2023  End Date: --    ONDANSETRON (ZOFRAN-ODT) 4 MG TBDL    Take 1 tablet (4 mg total) by mouth every 8 (eight) hours as needed (nausea/vomiting).       Start Date: 5/8/2023  End Date: --    SUMATRIPTAN (IMITREX) 50 MG TABLET    Take 1 tablet (50 mg total) by mouth every 2 (two) hours as needed for Migraine.       Start Date: 5/8/2023  End Date: 9/6/2023   Changed and/or Refilled Medications    Modified Medication Previous Medication    HYDROXYCHLOROQUINE (PLAQUENIL) 200 MG TABLET hydrOXYchloroQUINE (PLAQUENIL) 200 mg tablet       Take 1 tablet (200 mg total) by mouth 2 (two) times daily. After food    Take 1 tablet (200 mg total) by mouth 2 (two) times daily. After food       Start Date: 9/6/2023  End Date: 8/31/2024    Start Date: 4/17/2023 End Date: 9/6/2023    PANTOPRAZOLE (PROTONIX) 40 MG TABLET pantoprazole (PROTONIX) 40 MG tablet       Take 1 tablet (40 mg total) by mouth 3 (three) times daily.    Take 40 mg by mouth 3 (three) times daily.       Start Date: 9/6/2023  End Date: --    Start Date: 10/25/2022End Date: 9/6/2023   Discontinued Medications    TIZANIDINE (ZANAFLEX) 2 MG TABLET    Take 1 tablet (2 mg total) by mouth nightly.       Start Date: 4/3/2023  End Date: 9/6/2023         Plan:         Problem List Items Addressed This Visit          Neuro    Migraine without status migrainosus, not intractable    Relevant Medications    hydrOXYchloroQUINE (PLAQUENIL) 200 mg tablet    pantoprazole (PROTONIX) 40 MG tablet       Derm    Psoriasis    Relevant Medications    hydrOXYchloroQUINE (PLAQUENIL) 200 mg  tablet    pantoprazole (PROTONIX) 40 MG tablet       Cardiac/Vascular    Primary hypertension    Relevant Medications    hydrOXYchloroQUINE (PLAQUENIL) 200 mg tablet    pantoprazole (PROTONIX) 40 MG tablet       Orthopedic    Psoriatic arthritis - Primary    Relevant Medications    hydrOXYchloroQUINE (PLAQUENIL) 200 mg tablet    pantoprazole (PROTONIX) 40 MG tablet    Fibromyalgia syndrome    Relevant Medications    hydrOXYchloroQUINE (PLAQUENIL) 200 mg tablet    pantoprazole (PROTONIX) 40 MG tablet       Other    Insomnia    Relevant Medications    hydrOXYchloroQUINE (PLAQUENIL) 200 mg tablet    pantoprazole (PROTONIX) 40 MG tablet

## 2023-09-07 ENCOUNTER — PATIENT MESSAGE (OUTPATIENT)
Dept: RESEARCH | Facility: HOSPITAL | Age: 63
End: 2023-09-07
Payer: COMMERCIAL

## 2023-09-07 LAB — GRAM STN SPEC: NORMAL

## 2023-09-09 LAB — BACTERIA WND CULT: NO GROWTH

## 2023-10-03 RX ORDER — FENOFIBRATE 48 MG/1
TABLET, FILM COATED ORAL
Qty: 90 TABLET | Refills: 3 | Status: SHIPPED | OUTPATIENT
Start: 2023-10-03

## 2023-10-03 RX ORDER — MONTELUKAST SODIUM 10 MG/1
TABLET ORAL
Qty: 90 TABLET | Refills: 3 | Status: SHIPPED | OUTPATIENT
Start: 2023-10-03

## 2023-10-05 ENCOUNTER — TELEPHONE (OUTPATIENT)
Dept: RHEUMATOLOGY | Facility: CLINIC | Age: 63
End: 2023-10-05
Payer: COMMERCIAL

## 2023-10-05 DIAGNOSIS — L40.50 PSORIATIC ARTHRITIS: Primary | ICD-10-CM

## 2023-10-05 NOTE — TELEPHONE ENCOUNTER
The patient states that she is having a flare up. She states that she is having pains in the feet, making it hard for her to walk. She also states pains in the right hand.

## 2023-10-06 RX ORDER — PREDNISONE 5 MG/1
5 TABLET ORAL DAILY PRN
Qty: 30 TABLET | Refills: 2 | Status: SHIPPED | OUTPATIENT
Start: 2023-10-06

## 2023-10-06 NOTE — TELEPHONE ENCOUNTER
I sent a very low dose prednisone 5mg to her pharmacy. She can take one daily as needed for flare up. She may take it a few days in a row for this flare up. After that it should only be as needed. She should avoid sweet foods while taking the low dose prednisone.

## 2023-10-09 LAB — FUNGUS SPEC CULT: NORMAL

## 2023-10-25 ENCOUNTER — TELEPHONE (OUTPATIENT)
Dept: RHEUMATOLOGY | Facility: CLINIC | Age: 63
End: 2023-10-25
Payer: COMMERCIAL

## 2023-10-25 DIAGNOSIS — L40.50 PSORIATIC ARTHRITIS: Primary | ICD-10-CM

## 2023-10-25 RX ORDER — LEFLUNOMIDE 20 MG/1
20 TABLET ORAL
Qty: 30 TABLET | Refills: 11 | Status: SHIPPED | OUTPATIENT
Start: 2023-10-25 | End: 2024-10-24

## 2023-10-25 NOTE — TELEPHONE ENCOUNTER
The patient states that she has been having flare ups. Yesterday she was having very bad bilateral hip pain. Today her pains isn't as bad, but she would like to know if there is something she could do.

## 2023-10-25 NOTE — TELEPHONE ENCOUNTER
I recommend adding Arava once daily after supper on top of her medications.  This would be an additional treatment to help the patient out.  It takes a week or 2 to start working.  It can cause diarrhea in the beginning then it goes away.  I called it in for her.  Thank you BPH

## 2023-10-26 NOTE — TELEPHONE ENCOUNTER
Patient called back and I advised her of the new medication and the proper way to take it. She verbalized her understanding. She also stated, as extra information, that she is giving herself red light therapy at home for her arthritis.

## 2023-11-07 ENCOUNTER — TELEPHONE (OUTPATIENT)
Dept: FAMILY MEDICINE | Facility: CLINIC | Age: 63
End: 2023-11-07
Payer: COMMERCIAL

## 2023-11-07 NOTE — TELEPHONE ENCOUNTER
----- Message from Benton Brandt sent at 11/7/2023  4:15 PM CST -----  .Caller is requesting to schedule their Lab appointment prior to annual appointment.  Order is not listed in EPIC.  Please enter order and contact patient to schedule.    Name of Caller:pt    Preferred Date and Time of Labs:    Date of [facility] Appointment:11/08/23    Where would they like the lab performed?In office    Would the patient rather a call back or a response via [facility]? Call back    Best Call Back Number:[phone]    Additional Information:needing to discuss labs

## 2023-11-08 ENCOUNTER — OFFICE VISIT (OUTPATIENT)
Dept: FAMILY MEDICINE | Facility: CLINIC | Age: 63
End: 2023-11-08
Payer: COMMERCIAL

## 2023-11-08 VITALS
RESPIRATION RATE: 17 BRPM | BODY MASS INDEX: 29.64 KG/M2 | SYSTOLIC BLOOD PRESSURE: 122 MMHG | DIASTOLIC BLOOD PRESSURE: 79 MMHG | OXYGEN SATURATION: 99 % | HEIGHT: 61 IN | WEIGHT: 157 LBS | HEART RATE: 87 BPM

## 2023-11-08 DIAGNOSIS — Z12.31 VISIT FOR SCREENING MAMMOGRAM: ICD-10-CM

## 2023-11-08 DIAGNOSIS — Z00.00 WELLNESS EXAMINATION: Primary | ICD-10-CM

## 2023-11-08 DIAGNOSIS — I10 PRIMARY HYPERTENSION: ICD-10-CM

## 2023-11-08 DIAGNOSIS — E04.1 THYROID NODULE: ICD-10-CM

## 2023-11-08 DIAGNOSIS — E78.49 ESSENTIAL FAMILIAL HYPERLIPIDEMIA: ICD-10-CM

## 2023-11-08 PROCEDURE — 3008F PR BODY MASS INDEX (BMI) DOCUMENTED: ICD-10-PCS | Mod: CPTII,,, | Performed by: FAMILY MEDICINE

## 2023-11-08 PROCEDURE — 3074F SYST BP LT 130 MM HG: CPT | Mod: CPTII,,, | Performed by: FAMILY MEDICINE

## 2023-11-08 PROCEDURE — 3008F BODY MASS INDEX DOCD: CPT | Mod: CPTII,,, | Performed by: FAMILY MEDICINE

## 2023-11-08 PROCEDURE — 3074F PR MOST RECENT SYSTOLIC BLOOD PRESSURE < 130 MM HG: ICD-10-PCS | Mod: CPTII,,, | Performed by: FAMILY MEDICINE

## 2023-11-08 PROCEDURE — 3078F DIAST BP <80 MM HG: CPT | Mod: CPTII,,, | Performed by: FAMILY MEDICINE

## 2023-11-08 PROCEDURE — 3078F PR MOST RECENT DIASTOLIC BLOOD PRESSURE < 80 MM HG: ICD-10-PCS | Mod: CPTII,,, | Performed by: FAMILY MEDICINE

## 2023-11-08 PROCEDURE — 99396 PR PREVENTIVE VISIT,EST,40-64: ICD-10-PCS | Mod: ,,, | Performed by: FAMILY MEDICINE

## 2023-11-08 PROCEDURE — 1159F PR MEDICATION LIST DOCUMENTED IN MEDICAL RECORD: ICD-10-PCS | Mod: CPTII,,, | Performed by: FAMILY MEDICINE

## 2023-11-08 PROCEDURE — 36415 COLL VENOUS BLD VENIPUNCTURE: CPT | Mod: ,,, | Performed by: FAMILY MEDICINE

## 2023-11-08 PROCEDURE — 1160F PR REVIEW ALL MEDS BY PRESCRIBER/CLIN PHARMACIST DOCUMENTED: ICD-10-PCS | Mod: CPTII,,, | Performed by: FAMILY MEDICINE

## 2023-11-08 PROCEDURE — 1159F MED LIST DOCD IN RCRD: CPT | Mod: CPTII,,, | Performed by: FAMILY MEDICINE

## 2023-11-08 PROCEDURE — 1160F RVW MEDS BY RX/DR IN RCRD: CPT | Mod: CPTII,,, | Performed by: FAMILY MEDICINE

## 2023-11-08 PROCEDURE — 36415 PR COLLECTION VENOUS BLOOD,VENIPUNCTURE: ICD-10-PCS | Mod: ,,, | Performed by: FAMILY MEDICINE

## 2023-11-08 PROCEDURE — 99396 PREV VISIT EST AGE 40-64: CPT | Mod: ,,, | Performed by: FAMILY MEDICINE

## 2023-11-08 NOTE — PATIENT INSTRUCTIONS
Jeremias Barron,     If you are due for any health screening(s) below please notify me so we can arrange them to be ordered and scheduled. Most healthy patients at your age complete them, but you are free to accept or refuse.     If you can't do it, I'll definitely understand. If you can, I'd certainly appreciate it!    All of your core healthy metrics are met.

## 2023-11-09 ENCOUNTER — PATIENT MESSAGE (OUTPATIENT)
Dept: FAMILY MEDICINE | Facility: CLINIC | Age: 63
End: 2023-11-09
Payer: COMMERCIAL

## 2023-11-09 ENCOUNTER — TELEPHONE (OUTPATIENT)
Dept: RHEUMATOLOGY | Facility: CLINIC | Age: 63
End: 2023-11-09
Payer: COMMERCIAL

## 2023-11-09 DIAGNOSIS — M06.4 INFLAMMATORY POLYARTHROPATHY: ICD-10-CM

## 2023-11-09 DIAGNOSIS — R74.8 ELEVATED CPK: Primary | ICD-10-CM

## 2023-11-09 DIAGNOSIS — E78.49 ESSENTIAL FAMILIAL HYPERLIPIDEMIA: ICD-10-CM

## 2023-11-09 NOTE — TELEPHONE ENCOUNTER
Patient states her PCP requests you to review her recent labs. Her CPK levels are elevated. Her PCP wanted to rule out RA and try to figure out what is going on.

## 2023-11-10 ENCOUNTER — LAB VISIT (OUTPATIENT)
Dept: LAB | Facility: HOSPITAL | Age: 63
End: 2023-11-10
Attending: FAMILY MEDICINE
Payer: COMMERCIAL

## 2023-11-10 DIAGNOSIS — R74.8 ELEVATED CPK: ICD-10-CM

## 2023-11-10 LAB — CK SERPL-CCNC: 97 U/L (ref 29–168)

## 2023-11-10 PROCEDURE — 82550 ASSAY OF CK (CPK): CPT

## 2023-11-10 PROCEDURE — 82550 ASSAY OF CK (CPK): CPT | Mod: 91

## 2023-11-10 PROCEDURE — 36415 COLL VENOUS BLD VENIPUNCTURE: CPT

## 2023-11-10 NOTE — TELEPHONE ENCOUNTER
I sent my note to her pmd. Also the repeat ck went back to normal. This was a labs error. Thanks bh

## 2023-11-12 LAB
CK SERPL-CCNC: 95 U/L (ref 26–192)
SPECIMEN SOURCE: NORMAL

## 2023-11-14 ENCOUNTER — TELEPHONE (OUTPATIENT)
Dept: FAMILY MEDICINE | Facility: CLINIC | Age: 63
End: 2023-11-14
Payer: COMMERCIAL

## 2023-11-22 ENCOUNTER — DOCUMENTATION ONLY (OUTPATIENT)
Dept: FAMILY MEDICINE | Facility: CLINIC | Age: 63
End: 2023-11-22
Payer: COMMERCIAL

## 2023-11-27 DIAGNOSIS — E04.1 LEFT THYROID NODULE: Primary | ICD-10-CM

## 2024-03-20 RX ORDER — TRAZODONE HYDROCHLORIDE 50 MG/1
25 TABLET ORAL NIGHTLY PRN
Qty: 15 TABLET | Refills: 5 | Status: SHIPPED | OUTPATIENT
Start: 2024-03-20

## 2024-03-25 ENCOUNTER — PATIENT MESSAGE (OUTPATIENT)
Dept: FAMILY MEDICINE | Facility: CLINIC | Age: 64
End: 2024-03-25
Payer: COMMERCIAL

## 2024-03-25 ENCOUNTER — DOCUMENTATION ONLY (OUTPATIENT)
Dept: FAMILY MEDICINE | Facility: CLINIC | Age: 64
End: 2024-03-25
Payer: COMMERCIAL

## 2024-03-25 LAB — BCS RECOMMENDATION EXT: NORMAL

## 2024-06-26 DIAGNOSIS — G43.709 CHRONIC MIGRAINE WITHOUT AURA WITHOUT STATUS MIGRAINOSUS, NOT INTRACTABLE: ICD-10-CM

## 2024-06-26 RX ORDER — SUMATRIPTAN 50 MG/1
50 TABLET, FILM COATED ORAL
Qty: 9 TABLET | Refills: 3 | Status: SHIPPED | OUTPATIENT
Start: 2024-06-26 | End: 2024-07-26

## 2024-12-11 DIAGNOSIS — G43.709 CHRONIC MIGRAINE WITHOUT AURA WITHOUT STATUS MIGRAINOSUS, NOT INTRACTABLE: ICD-10-CM

## 2024-12-11 RX ORDER — SUMATRIPTAN SUCCINATE 50 MG/1
50 TABLET ORAL
Qty: 9 TABLET | Refills: 3 | Status: SHIPPED | OUTPATIENT
Start: 2024-12-11 | End: 2025-01-10